# Patient Record
Sex: FEMALE | Race: WHITE | Employment: FULL TIME | URBAN - METROPOLITAN AREA
[De-identification: names, ages, dates, MRNs, and addresses within clinical notes are randomized per-mention and may not be internally consistent; named-entity substitution may affect disease eponyms.]

---

## 2017-01-17 ENCOUNTER — TELEPHONE (OUTPATIENT)
Dept: INTERNAL MEDICINE CLINIC | Age: 52
End: 2017-01-17

## 2017-01-17 DIAGNOSIS — J06.9 UPPER RESPIRATORY TRACT INFECTION, UNSPECIFIED TYPE: Primary | ICD-10-CM

## 2017-01-17 NOTE — TELEPHONE ENCOUNTER
Pt is being seen today at [facility] ph# [phone], fax# [phone] on [address] [facility], because of no appmnts here today, she went on her own, she called and stated tht they will let her sign a waiver, she is being seen for upper resp issues, RM

## 2017-01-20 NOTE — TELEPHONE ENCOUNTER
Referral completed for Dr. Christa Lemus   At [facility]    1/1/2017-1/31/2017 2 visits    Copy faxed to [facility] and mailed to pt

## 2017-02-28 ENCOUNTER — OFFICE VISIT (OUTPATIENT)
Dept: INTERNAL MEDICINE CLINIC | Age: 52
End: 2017-02-28

## 2017-02-28 VITALS
SYSTOLIC BLOOD PRESSURE: 136 MMHG | TEMPERATURE: 98 F | HEIGHT: 64 IN | DIASTOLIC BLOOD PRESSURE: 78 MMHG | WEIGHT: 156.4 LBS | OXYGEN SATURATION: 98 % | RESPIRATION RATE: 12 BRPM | HEART RATE: 72 BPM | BODY MASS INDEX: 26.7 KG/M2

## 2017-02-28 DIAGNOSIS — Z23 ENCOUNTER FOR IMMUNIZATION: ICD-10-CM

## 2017-02-28 DIAGNOSIS — D50.0 BLOOD LOSS ANEMIA: ICD-10-CM

## 2017-02-28 DIAGNOSIS — I10 ESSENTIAL HYPERTENSION WITH GOAL BLOOD PRESSURE LESS THAN 140/90: Primary | ICD-10-CM

## 2017-02-28 DIAGNOSIS — Z12.31 SCREENING MAMMOGRAM, ENCOUNTER FOR: ICD-10-CM

## 2017-02-28 DIAGNOSIS — M54.12 CERVICAL RADICULOPATHY: ICD-10-CM

## 2017-02-28 RX ORDER — GABAPENTIN 300 MG/1
300 CAPSULE ORAL 3 TIMES DAILY
Qty: 90 CAP | Refills: 2 | Status: SHIPPED | OUTPATIENT
Start: 2017-02-28 | End: 2017-05-04 | Stop reason: ALTCHOICE

## 2017-02-28 NOTE — PROGRESS NOTES
Leighann Ortega 1965, is a 49 y.o. female, who is seen today for reevaluation of hypertension, history of breast biopsy, hypovitaminosis D but also complaining of neck and arm pain. She has had cervical radiculopathy in the past and did not really improve much with steroids or nonsteroidals but finally did get better. She was out working in her yard 2 or 3 days ago started having pain in the right posterior lateral neck all the way down her arm. She took half of an oxycodone tablet and that knocked her out for the day on Sunday 2 days ago and ibuprofen and Aleve have not been helpful in the past so she did not try those. She was going to donate blood recently was told she had low iron and it was rechecked at the [facility] [employer]. She had not donated blood for 2 years. Past Medical History:   Diagnosis Date    HSV infection     Type 2    IBS (irritable bowel syndrome)      Current Outpatient Prescriptions   Medication Sig Dispense Refill    OXYCODONE HCL (OXYCODONE PO) Take  by mouth.  cholecalciferol, vitamin D3, (VITAMIN D3) 2,000 unit tab 1 tablet by mouth daily 100 Tab prn    hydrochlorothiazide (HYDRODIURIL) 25 mg tablet Take 1 Tab by mouth daily. 90 Tab 3    valacyclovir (VALTREX) 1 gram tablet Take 1 tablet by mouth two (2) times a day. 6 tablet 0     Visit Vitals    /78    Pulse 72    Temp 98 °F (36.7 °C) (Oral)    Resp 12    Ht 5' 4\" (1.626 m)    Wt 156 lb 6.4 oz (70.9 kg)    SpO2 98%    BMI 26.85 kg/m2     Extension of the neck and downward pressure produces pain only in the posterior lateral right neck.  strength is normal bilaterally. Neck reveals no adenopathy or thyromegaly. Carotids are 2+ without bruits. Lungs are clear to percussion. Good breath sounds with no wheezing or crackles. Heart reveals a regular rhythm with normal S1 and S2 no murmur gallop click or rub. Apical impulse is not palpable.   Abdomen soft and nontender with no hepatosplenomegaly or masses and no bruits. Extremities reveal no clubbing cyanosis or edema. Pulses are 2+. Assessment: #1. Hypertension controlled. She will continue hydrochlorothiazide 25 mg daily. #2.  Possible iron deficiency anemia from [place] readings, we will check CBC and iron studies now. #3. Hypovitaminosis D now on vitamin D supplements. She will continue 1000 units daily. #4.  Recurrence of cervical radiculopathy. We will start her on gabapentin 300 mg 3 times daily. She may stop this after a month or so if she is doing very well. She will get a tetanus shot today and we will plan follow-up in 6 months for physical without lab in a 15 minute slot. I will also order mammogram now. Izaiah Grady MD FACP    Please note: This document has been produced using voice recognition software. Unrecognized errors in transcription may be present.

## 2017-02-28 NOTE — MR AVS SNAPSHOT
Visit Information Date & Time Provider Department Dept. Phone Encounter #  
 2/28/2017  3:30 PM Gabe Singh MD Internist of [facility] [address] [id number] Follow-up Instructions Return in about 6 months (around 8/28/2017). Your Appointments 8/30/2017  3:30 PM  
Office Visit with Gabe Singh MD  
[facility] Appt Note: 6 month f/u  
 [address] [facility], [address] [address] [address] [facility]  
  
   
 [address], [address] Upcoming Health Maintenance Date Due DTaP/Tdap/Td series (1 - Tdap) 11/4/2006 FOBT Q 1 YEAR AGE 50-75 6/10/2016 BREAST CANCER SCRN MAMMOGRAM 12/10/2017 PAP AKA CERVICAL CYTOLOGY 8/29/2019 Allergies as of 2/28/2017  Review Complete On: 2/28/2017 By: Gabe Singh MD  
  
 Severity Noted Reaction Type Reactions Clindamycin  06/06/2016    Other (comments) Bowel infection. Pcn [Penicillins]    Unknown (comments) Tramadol  06/08/2012   Side Effect Nausea Only Current Immunizations  Reviewed on 10/30/2013 Name Date Influenza Vaccine 10/28/2013 Influenza Vaccine (Quad) PF 11/15/2016 Influenza Vaccine Split 10/26/2012 Influenza Vaccine Whole 10/29/2010 TD Vaccine 11/3/2006 Tdap 2/28/2017  4:07 PM  
  
 Not reviewed this visit You Were Diagnosed With   
  
 Codes Comments Essential hypertension with goal blood pressure less than 140/90    -  Primary ICD-10-CM: I10 
ICD-9-CM: 401.9 Encounter for immunization     ICD-10-CM: L64 ICD-9-CM: V03.89 Blood loss anemia     ICD-10-CM: D50.0 ICD-9-CM: 280.0 Screening mammogram, encounter for     ICD-10-CM: Z12.31 
ICD-9-CM: V76.12 Cervical radiculopathy     ICD-10-CM: M54.12 
ICD-9-CM: 723.4 Vitals BP  
  
  
  
  
  
 136/78 Vitals History BMI and BSA Data Body Mass Index Body Surface Area 26.85 kg/m 2 1.79 m 2 Preferred Pharmacy Pharmacy Name Phone [facility] [address] [place] [address] [phone] Your Updated Medication List  
  
   
This list is accurate as of: 2/28/17  4:08 PM.  Always use your most recent med list.  
  
  
  
  
 cholecalciferol (vitamin D3) 2,000 unit Tab Commonly known as:  VITAMIN D3  
1 tablet by mouth daily  
  
 gabapentin 300 mg capsule Commonly known as:  NEURONTIN Take 1 Cap by mouth three (3) times daily. hydroCHLOROthiazide 25 mg tablet Commonly known as:  HYDRODIURIL Take 1 Tab by mouth daily. OXYCODONE PO Take  by mouth. valACYclovir 1 gram tablet Commonly known as:  VALTREX Take 1 tablet by mouth two (2) times a day. Prescriptions Sent to Pharmacy Refills  
 gabapentin (NEURONTIN) 300 mg capsule 2 Sig: Take 1 Cap by mouth three (3) times daily. Class: Normal  
 Pharmacy: [address] #: [phone] Route: Oral  
  
We Performed the Following TETANUS, DIPHTHERIA TOXOIDS AND ACELLULAR PERTUSSIS VACCINE (TDAP), IN INDIVIDS. >=7, IM [id number] CPT(R)] Follow-up Instructions Return in about 6 months (around 8/28/2017). To-Do List   
 Around 02/28/2017 Lab:  CBC WITH AUTOMATED DIFF Around 02/28/2017 Lab:  IRON PROFILE   
  
 02/28/2017 Imaging:  [facility] MAMMO BI SCREENING INCL CAD Introducing [facility] & HEALTH SERVICES! Dear Heraclio Hayes: Thank you for requesting a m2fx account. Our records indicate that you already have an active m2fx account. You can access your account anytime at [email or web address]. [email or web address] Did you know that you can access your hospital and ER discharge instructions at any time in m2fx? You can also review all of your test results from your hospital stay or ER visit. Additional Information If you have questions, please visit the Frequently Asked Questions section of the [employer] website at [email or web address] Remember, SysClass is NOT to be used for urgent needs. For medical emergencies, dial 911. Now available from your iPhone and Android! Please provide this summary of care documentation to your next provider. Your primary care clinician is listed as Cheryle Byrd. Chip Vinson. If you have any questions after today's visit, please call [phone].

## 2017-03-01 LAB
BASOPHILS # BLD AUTO: 0 X10E3/UL (ref 0–0.2)
BASOPHILS NFR BLD AUTO: 0 %
EOSINOPHIL # BLD AUTO: 0.2 X10E3/UL (ref 0–0.4)
EOSINOPHIL NFR BLD AUTO: 2 %
ERYTHROCYTE [DISTWIDTH] IN BLOOD BY AUTOMATED COUNT: 13.6 % (ref 12.3–15.4)
HCT VFR BLD AUTO: 41.3 % (ref 34–46.6)
HGB BLD-MCNC: 13.5 G/DL (ref 11.1–15.9)
IMM GRANULOCYTES # BLD: 0 X10E3/UL (ref 0–0.1)
IMM GRANULOCYTES NFR BLD: 0 %
IRON SATN MFR SERPL: 21 % (ref 15–55)
IRON SERPL-MCNC: 59 UG/DL (ref 27–159)
LYMPHOCYTES # BLD AUTO: 3.4 X10E3/UL (ref 0.7–3.1)
LYMPHOCYTES NFR BLD AUTO: 32 %
MCH RBC QN AUTO: 29.5 PG (ref 26.6–33)
MCHC RBC AUTO-ENTMCNC: 32.7 G/DL (ref 31.5–35.7)
MCV RBC AUTO: 90 FL (ref 79–97)
MONOCYTES # BLD AUTO: 0.5 X10E3/UL (ref 0.1–0.9)
MONOCYTES NFR BLD AUTO: 5 %
NEUTROPHILS # BLD AUTO: 6.5 X10E3/UL (ref 1.4–7)
NEUTROPHILS NFR BLD AUTO: 61 %
PLATELET # BLD AUTO: 293 X10E3/UL (ref 150–379)
RBC # BLD AUTO: 4.57 X10E6/UL (ref 3.77–5.28)
TIBC SERPL-MCNC: 276 UG/DL (ref 250–450)
UIBC SERPL-MCNC: 217 UG/DL (ref 131–425)
WBC # BLD AUTO: 10.8 X10E3/UL (ref 3.4–10.8)

## 2017-03-02 ENCOUNTER — TELEPHONE (OUTPATIENT)
Dept: INTERNAL MEDICINE CLINIC | Age: 52
End: 2017-03-02

## 2017-03-02 NOTE — TELEPHONE ENCOUNTER
----- Message from Madelyn Pena MD sent at 3/1/2017  9:19 AM EST -----  Please notify the patient her blood count and iron levels are normal.  She is not anemic.

## 2017-03-07 ENCOUNTER — TELEPHONE (OUTPATIENT)
Dept: INTERNAL MEDICINE CLINIC | Age: 52
End: 2017-03-07

## 2017-03-07 DIAGNOSIS — Z12.39 BREAST CANCER SCREENING: ICD-10-CM

## 2017-03-07 DIAGNOSIS — Z87.898 HX OF ABNORMAL MAMMOGRAM: Primary | ICD-10-CM

## 2017-03-07 NOTE — TELEPHONE ENCOUNTER
Please put in order for Bilateral Diagnostic mammogram with ultrasound if needed for above patient she has appt with them tomorrow.

## 2017-03-08 ENCOUNTER — HOSPITAL ENCOUNTER (OUTPATIENT)
Dept: MAMMOGRAPHY | Age: 52
Discharge: HOME | End: 2017-03-08
Attending: INTERNAL MEDICINE
Payer: COMMERCIAL

## 2017-03-08 DIAGNOSIS — Z87.898 HX OF ABNORMAL MAMMOGRAM: ICD-10-CM

## 2017-03-08 DIAGNOSIS — Z12.39 BREAST CANCER SCREENING: ICD-10-CM

## 2017-03-08 PROCEDURE — 77062 BREAST TOMOSYNTHESIS BI: CPT

## 2017-03-21 ENCOUNTER — DOCUMENTATION ONLY (OUTPATIENT)
Dept: SURGERY | Age: 52
End: 2017-03-21

## 2017-05-04 ENCOUNTER — OFFICE VISIT (OUTPATIENT)
Dept: INTERNAL MEDICINE CLINIC | Age: 52
End: 2017-05-04

## 2017-05-04 VITALS
HEIGHT: 64 IN | SYSTOLIC BLOOD PRESSURE: 134 MMHG | OXYGEN SATURATION: 98 % | HEART RATE: 84 BPM | BODY MASS INDEX: 26.4 KG/M2 | DIASTOLIC BLOOD PRESSURE: 76 MMHG | RESPIRATION RATE: 12 BRPM | WEIGHT: 154.6 LBS | TEMPERATURE: 98.2 F

## 2017-05-04 DIAGNOSIS — M54.12 CERVICAL RADICULOPATHY: Primary | ICD-10-CM

## 2017-05-04 NOTE — PROGRESS NOTES
Alexandria Garrett 1965, is a 49 y.o. female, who is seen today for pain in the right arm and hand and now a new nodule in the upper right back. She has been treated for what seems to be cervical radiculopathy several times, suboptimal relief with prednisone, was even tried on gabapentin and did seem to get better but not necessarily from the gabapentin. She is on neither drug now and went to a massage therapist to try to get some relief from symptoms and a month ago the therapist felt something in her upper back just to the right of the spine and 2 days ago she could actually see a bump in that area. 2 nights ago she also developed rather severe pulsating pain involving the whole right arm into the hand but kept her awake at night and was still there yesterday so last night she took a pain pill and was able to sleep and the pain is minimal today. She thinks the pain in the arm emanates from that raised area on her upper right back. Past Medical History:   Diagnosis Date    HSV infection     Type 2    IBS (irritable bowel syndrome)      Current Outpatient Prescriptions   Medication Sig Dispense Refill    OXYCODONE HCL (OXYCODONE PO) Take  by mouth.  cholecalciferol, vitamin D3, (VITAMIN D3) 2,000 unit tab 1 tablet by mouth daily 100 Tab prn    hydrochlorothiazide (HYDRODIURIL) 25 mg tablet Take 1 Tab by mouth daily. 90 Tab 3    valacyclovir (VALTREX) 1 gram tablet Take 1 tablet by mouth two (2) times a day. 6 tablet 0     Visit Vitals    /76    Pulse 84    Temp 98.2 °F (36.8 °C) (Oral)    Resp 12    Ht 5' 4\" (1.626 m)    Wt 154 lb 9.6 oz (70.1 kg)    SpO2 98%    BMI 26.54 kg/m2     There is a slightly raised fairly soft area just to the right of the upper thoracic spine which is about 2 cm in diameter nontender and feels like a fairly soft lipoma. The overlying skin appears normal.  Good range of motion of the right shoulder. Good radial pulses.   With extension and downward pressure of the neck there is some discomfort into the posterior lateral right neck but not into the intrascapular area or the arm. Assessment: She has had symptoms of cervical radiculopathy but not ever reproduced with maneuvers here in the office. She now has what feels like a lipoma, however her pain feels like it emanates from that area into the arm when it occurs. This is a confusing picture to me so I will have her see a neurologist.  She concurs. Follow-up as previously planned    [facility]. Josué Mason MD FACP    Please note: This document has been produced using voice recognition software. Unrecognized errors in transcription may be present.

## 2017-05-04 NOTE — MR AVS SNAPSHOT
Visit Information Date & Time Provider Department Dept. Phone Encounter #  
 5/4/2017  8:30 AM Eric Noble MD Internist of [address] [id number] Your Appointments 8/30/2017  3:30 PM  
Office Visit with Eric Noble MD  
Internist of [address] [place] [address] [facility]) Appt Note: 6 month f/u  
 [address] [facility], [address] [address] [address] [place]  
  
   
 [address], [address] Upcoming Health Maintenance Date Due FOBT Q 1 YEAR AGE 50-75 6/10/2016 INFLUENZA AGE 9 TO ADULT 8/1/2017 BREAST CANCER SCRN MAMMOGRAM 3/8/2019 PAP AKA CERVICAL CYTOLOGY 8/29/2019 DTaP/Tdap/Td series (2 - Td) 2/28/2027 Allergies as of 5/4/2017  Review Complete On: 5/4/2017 By: Eric Noble MD  
  
 Severity Noted Reaction Type Reactions Clindamycin  06/06/2016    Other (comments) Bowel infection. Pcn [Penicillins]    Unknown (comments) Tramadol  06/08/2012   Side Effect Nausea Only Current Immunizations  Reviewed on 2/28/2017 Name Date Influenza Vaccine 10/28/2013 Influenza Vaccine (Quad) PF 11/15/2016 Influenza Vaccine Split 10/26/2012 Influenza Vaccine Whole 10/29/2010 TD Vaccine 11/3/2006 Tdap 2/28/2017  4:07 PM  
  
 Not reviewed this visit You Were Diagnosed With   
  
 Codes Comments Cervical radiculopathy    -  Primary ICD-10-CM: M54.12 
ICD-9-CM: 723.4 Vitals BP Pulse Temp Resp Height(growth percentile) Weight(growth percentile) 134/76 84 98.2 °F (36.8 °C) (Oral) 12 5' 4\" (1.626 m) 154 lb 9.6 oz (70.1 kg) SpO2 BMI OB Status Smoking Status 98% 26.54 kg/m2 Having regular periods Former Smoker Vitals History BMI and BSA Data Body Mass Index Body Surface Area  
 26.54 kg/m 2 1.78 m 2 Preferred Pharmacy Pharmacy Name Phone [facility], [address] [place] [address] [phone] Your Updated Medication List  
  
   
This list is accurate as of: 5/4/17  9:11 AM.  Always use your most recent med list.  
  
  
  
  
 cholecalciferol (vitamin D3) 2,000 unit Tab Commonly known as:  VITAMIN D3  
1 tablet by mouth daily  
  
 hydroCHLOROthiazide 25 mg tablet Commonly known as:  HYDRODIURIL Take 1 Tab by mouth daily. OXYCODONE PO Take  by mouth. valACYclovir 1 gram tablet Commonly known as:  VALTREX Take 1 tablet by mouth two (2) times a day. We Performed the Following REFERRAL TO NEUROLOGY [KYR31 Custom] Referral Information Referral ID Referred By Referred To  
  
 [id number] Ade Marcus MD   
   [address] [facility] [address] [place], [address] [place] [address]  Phone: [phone] Fax: [phone] Visits Status Start Date End Date 1 New Request 5/4/17 5/4/18 If your referral has a status of pending review or denied, additional information will be sent to support the outcome of this decision. Introducing [facility] & HEALTH SERVICES! Dear Arsenio Aguilera: Thank you for requesting a [employer] account. Our records indicate that you already have an active [employer]ask account. You can access your account anytime at [email or web address] Did you know that you can access your hospital and ER discharge instructions at any time in Cask? You can also review all of your test results from your hospital stay or ER visit. Additional Information If you have questions, please visit the Frequently Asked Questions section of the [employer] website at [email or web address] Remember, Cask is NOT to be used for urgent needs. For medical emergencies, dial 911. Now available from your iPhone and Android! Please provide this summary of care documentation to your next provider. Your primary care clinician is listed as Mercedes Zhang. Horatio Gitelman.  If you have any questions after today's visit, please call [phone].

## 2017-06-01 RX ORDER — HYDROCHLOROTHIAZIDE 25 MG/1
TABLET ORAL
Qty: 90 TAB | Refills: 2 | Status: SHIPPED | OUTPATIENT
Start: 2017-06-01 | End: 2017-08-28 | Stop reason: SDUPTHER

## 2017-06-09 RX ORDER — CYCLOBENZAPRINE HCL 10 MG
10 TABLET ORAL
Qty: 30 TAB | Refills: 0 | Status: SHIPPED | OUTPATIENT
Start: 2017-06-09 | End: 2017-12-01 | Stop reason: SDUPTHER

## 2017-08-03 ENCOUNTER — TELEPHONE (OUTPATIENT)
Dept: INTERNAL MEDICINE CLINIC | Age: 52
End: 2017-08-03

## 2017-08-04 DIAGNOSIS — R63.5 WEIGHT GAIN: ICD-10-CM

## 2017-08-04 DIAGNOSIS — Z00.00 ROUTINE GENERAL MEDICAL EXAMINATION AT A HEALTH CARE FACILITY: Primary | ICD-10-CM

## 2017-08-09 DIAGNOSIS — Z00.00 ROUTINE GENERAL MEDICAL EXAMINATION AT A HEALTH CARE FACILITY: ICD-10-CM

## 2017-08-09 DIAGNOSIS — R63.5 WEIGHT GAIN: ICD-10-CM

## 2017-08-23 ENCOUNTER — HOSPITAL ENCOUNTER (OUTPATIENT)
Dept: LAB | Age: 52
Discharge: HOME | End: 2017-08-23

## 2017-08-23 PROCEDURE — 99001 SPECIMEN HANDLING PT-LAB: CPT | Performed by: INTERNAL MEDICINE

## 2017-08-24 LAB
ALBUMIN SERPL-MCNC: 4.2 G/DL (ref 3.5–5.5)
ALBUMIN/GLOB SERPL: 2.1 {RATIO} (ref 1.2–2.2)
ALP SERPL-CCNC: 62 IU/L (ref 39–117)
ALT SERPL-CCNC: 10 IU/L (ref 0–32)
AST SERPL-CCNC: 11 IU/L (ref 0–40)
BASOPHILS # BLD AUTO: 0 X10E3/UL (ref 0–0.2)
BASOPHILS NFR BLD AUTO: 0 %
BILIRUB SERPL-MCNC: 0.3 MG/DL (ref 0–1.2)
BUN SERPL-MCNC: 12 MG/DL (ref 6–24)
BUN/CREAT SERPL: 15 (ref 9–23)
CALCIUM SERPL-MCNC: 9.1 MG/DL (ref 8.7–10.2)
CHLORIDE SERPL-SCNC: 100 MMOL/L (ref 96–106)
CHOLEST SERPL-MCNC: 149 MG/DL (ref 100–199)
CO2 SERPL-SCNC: 28 MMOL/L (ref 18–29)
CREAT SERPL-MCNC: 0.81 MG/DL (ref 0.57–1)
EOSINOPHIL # BLD AUTO: 0.1 X10E3/UL (ref 0–0.4)
EOSINOPHIL NFR BLD AUTO: 2 %
ERYTHROCYTE [DISTWIDTH] IN BLOOD BY AUTOMATED COUNT: 13.4 % (ref 12.3–15.4)
GLOBULIN SER CALC-MCNC: 2 G/DL (ref 1.5–4.5)
GLUCOSE SERPL-MCNC: 88 MG/DL (ref 65–99)
HCT VFR BLD AUTO: 40.7 % (ref 34–46.6)
HDLC SERPL-MCNC: 58 MG/DL
HGB BLD-MCNC: 13.2 G/DL (ref 11.1–15.9)
IMM GRANULOCYTES # BLD: 0 X10E3/UL (ref 0–0.1)
IMM GRANULOCYTES NFR BLD: 0 %
INTERPRETATION, 910389: NORMAL
IRON SATN MFR SERPL: 31 % (ref 15–55)
IRON SERPL-MCNC: 86 UG/DL (ref 27–159)
LDLC SERPL CALC-MCNC: 65 MG/DL (ref 0–99)
LYMPHOCYTES # BLD AUTO: 2 X10E3/UL (ref 0.7–3.1)
LYMPHOCYTES NFR BLD AUTO: 31 %
MCH RBC QN AUTO: 29.9 PG (ref 26.6–33)
MCHC RBC AUTO-ENTMCNC: 32.4 G/DL (ref 31.5–35.7)
MCV RBC AUTO: 92 FL (ref 79–97)
MONOCYTES # BLD AUTO: 0.5 X10E3/UL (ref 0.1–0.9)
MONOCYTES NFR BLD AUTO: 7 %
NEUTROPHILS # BLD AUTO: 3.9 X10E3/UL (ref 1.4–7)
NEUTROPHILS NFR BLD AUTO: 60 %
PLATELET # BLD AUTO: 259 X10E3/UL (ref 150–379)
POTASSIUM SERPL-SCNC: 4 MMOL/L (ref 3.5–5.2)
PROT SERPL-MCNC: 6.2 G/DL (ref 6–8.5)
RBC # BLD AUTO: 4.42 X10E6/UL (ref 3.77–5.28)
SODIUM SERPL-SCNC: 141 MMOL/L (ref 134–144)
T4 FREE SERPL-MCNC: 1.16 NG/DL (ref 0.82–1.77)
TIBC SERPL-MCNC: 276 UG/DL (ref 250–450)
TRIGL SERPL-MCNC: 130 MG/DL (ref 0–149)
TSH SERPL DL<=0.005 MIU/L-ACNC: 3.33 UIU/ML (ref 0.45–4.5)
UIBC SERPL-MCNC: 190 UG/DL (ref 131–425)
VLDLC SERPL CALC-MCNC: 26 MG/DL (ref 5–40)
WBC # BLD AUTO: 6.5 X10E3/UL (ref 3.4–10.8)

## 2017-08-28 RX ORDER — HYDROCHLOROTHIAZIDE 25 MG/1
25 TABLET ORAL DAILY
Qty: 90 TAB | Refills: 2 | Status: SHIPPED | OUTPATIENT
Start: 2017-08-28 | End: 2018-05-26 | Stop reason: SDUPTHER

## 2017-08-28 NOTE — TELEPHONE ENCOUNTER
From: Jeanenne Gross Pipkin  To: Glenis Malcolm MD  Sent: 8/28/2017 9:23 AM EDT  Subject: Medication Renewal Request    Original authorizing provider: MD Katherine Sanchez.  Pipkin would like a refill of the following medications:  hydroCHLOROthiazide (HYDRODIURIL) 25 mg tablet Glenis Malcolm MD]    Preferred pharmacy: [facility]:

## 2017-08-30 ENCOUNTER — OFFICE VISIT (OUTPATIENT)
Dept: INTERNAL MEDICINE CLINIC | Age: 52
End: 2017-08-30

## 2017-08-30 VITALS
BODY MASS INDEX: 27.11 KG/M2 | DIASTOLIC BLOOD PRESSURE: 88 MMHG | OXYGEN SATURATION: 98 % | WEIGHT: 158.8 LBS | TEMPERATURE: 98.1 F | HEART RATE: 77 BPM | RESPIRATION RATE: 12 BRPM | HEIGHT: 64 IN | SYSTOLIC BLOOD PRESSURE: 138 MMHG

## 2017-08-30 DIAGNOSIS — Z00.00 ROUTINE GENERAL MEDICAL EXAMINATION AT A HEALTH CARE FACILITY: Primary | ICD-10-CM

## 2017-08-30 DIAGNOSIS — J30.1 NON-SEASONAL ALLERGIC RHINITIS DUE TO POLLEN: ICD-10-CM

## 2017-08-30 DIAGNOSIS — M54.12 CERVICAL RADICULOPATHY: ICD-10-CM

## 2017-08-30 DIAGNOSIS — I10 ESSENTIAL HYPERTENSION WITH GOAL BLOOD PRESSURE LESS THAN 140/90: ICD-10-CM

## 2017-08-30 DIAGNOSIS — Z23 ENCOUNTER FOR IMMUNIZATION: ICD-10-CM

## 2017-08-30 NOTE — PROGRESS NOTES
Julia Oh 1965, is a 47 y.o. female, who is seen today for a routine physical exam and follow-up on hypertension, cervical radiculopathy. She has had cervical radiculopathy on the right side off and on over the last couple of years, not a lot of benefit from prednisone and side effects from prednisone. She has used some oxycodone occasionally and it is not severe pain but still bothers her intermittently in the neck and at least custodial down the right arm. No weakness in the arm. She has hypertension and uses hydrochlorothiazide regularly. She has a history of low vitamin D and has been using 2000 units of vitamin D every day. She feels well but she thinks she is a little overweight. She has noticed that her right ear is stuffy from time to time. Past Medical History:   Diagnosis Date    HSV infection     Type 2    IBS (irritable bowel syndrome)      Past Surgical History:   Procedure Laterality Date    EXCISE BREAST LES W XRAY MARKER Right 1-22-16    Dr. Miki Mars Left 1/2/2015    LEFT NIPPLE DUCT EXPLRATION WITH EXCISIONAL BIOPSY performed by Bard Gayla MD at [address] [place] [address] BREAST BIOPSY Right 1/22/2016    WIDE LOCAL EXCISION WITH NEEDLE LOCALIZATION MAMMOGRAM RIGHT BREAST LESION performed by Bard Gayla MD at [address] [place] [address] COLONOSCOPY  10/03/2016    Normal tissue, 10 years     Current Outpatient Prescriptions   Medication Sig Dispense Refill    hydroCHLOROthiazide (HYDRODIURIL) 25 mg tablet Take 1 Tab by mouth daily. 90 Tab 2    cyclobenzaprine (FLEXERIL) 10 mg tablet Take 1 Tab by mouth three (3) times daily as needed for Muscle Spasm(s). 30 Tab 0    OXYCODONE HCL (OXYCODONE PO) Take  by mouth.  cholecalciferol, vitamin D3, (VITAMIN D3) 2,000 unit tab 1 tablet by mouth daily 100 Tab prn    valacyclovir (VALTREX) 1 gram tablet Take 1 tablet by mouth two (2) times a day.  6 tablet 0     Allergies   Allergen Reactions    Clindamycin Other (comments)     Bowel infection.  Pcn [Penicillins] Unknown (comments)    Tramadol Nausea Only     Social History     Social History    Marital status: SINGLE     Spouse name: N/A    Number of children: N/A    Years of education: N/A     Social History Main Topics    Smoking status: Former Smoker     Packs/day: 0.50     Quit date: 1/26/2016    Smokeless tobacco: Never Used    Alcohol use No    Drug use: No    Sexual activity: Yes     Partners: Male     Birth control/ protection: Pill     Other Topics Concern    None     Social History Narrative     Visit Vitals    /88    Pulse 77    Temp 98.1 °F (36.7 °C) (Oral)    Resp 12    Ht 5' 4\" (1.626 m)    Wt 158 lb 12.8 oz (72 kg)    SpO2 98%    BMI 27.26 kg/m2     The patient is a well-developed well-nourished female in no apparent distress. HEENT: Pupils are equal and react to light and extraocular movements are full. Ear canals and tympanic membranes appear normal. Oral cavity appears normal with no oral lesions. Neck: Carotids are 2+ without bruits. No adenopathy or thyromegaly. Lungs are clear to percussion. I hear no wheezing, rales or rhonchi. Heart reveals a regular rhythm with no murmur, gallop, click or rub. The apical impulse is in the fifth interspace at the midclavicular line. Abdomen is soft and nontender with no hepatosplenomegaly or masses. Bowel sounds are normoactive and there is no distention or tympany. Extremities reveal no clubbing cyanosis or edema. Pulses are 2+. Skin reveals no suspicious skin growths. Breasts reveal no masses, skin or nipple abnormalities. No axillary adenopathy.     Results for orders placed or performed in visit on 02/28/17   CBC WITH AUTOMATED DIFF   Result Value Ref Range    WBC 10.8 3.4 - 10.8 x10E3/uL    RBC 4.57 3.77 - 5.28 x10E6/uL    HGB 13.5 11.1 - 15.9 g/dL    HCT 41.3 34.0 - 46.6 %    MCV 90 79 - 97 fL    MCH 29.5 26.6 - 33.0 pg    MCHC 32.7 31.5 - 35.7 g/dL    RDW 13.6 12.3 - 15.4 % PLATELET 954 228 - 476 x10E3/uL    NEUTROPHILS 61 %    Lymphocytes 32 %    MONOCYTES 5 %    EOSINOPHILS 2 %    BASOPHILS 0 %    ABS. NEUTROPHILS 6.5 1.4 - 7.0 x10E3/uL    Abs Lymphocytes 3.4 (H) 0.7 - 3.1 x10E3/uL    ABS. MONOCYTES 0.5 0.1 - 0.9 x10E3/uL    ABS. EOSINOPHILS 0.2 0.0 - 0.4 x10E3/uL    ABS. BASOPHILS 0.0 0.0 - 0.2 x10E3/uL    IMMATURE GRANULOCYTES 0 %    ABS. IMM. GRANS. 0.0 0.0 - 0.1 x10E3/uL   IRON PROFILE   Result Value Ref Range    TIBC 276 250 - 450 ug/dL    UIBC 217 131 - 425 ug/dL    Iron 59 27 - 159 ug/dL    Iron % saturation 21 15 - 55 %     Assessment: #1. Intermittent cervical radiculopathy, she would like to see a chiropractor to see if she can get further improvement. She brings in list section by her insurance company. I will do a referral for Dr. Иван Chand. #2. Hypertension fairly controlled. She will continue hydrochlorothiazide 25 mg daily. #3.  Fullness of the right ear intermittently related to eustachian tube dysfunction with normal exam today. She will use Sudafed if needed. #4.  History of hypovitaminosis D now on 2000 units per day, this does not need to be repeated but she does need to stay on 2000 units daily indefinitely. #5.  Body mass index is 27.2, recommended she work on weight loss at least down 8 pounds to about 150 or less. Follow-up in 6 months    Izaiah Brown MD FACP    Please note: This document has been produced using voice recognition software. Unrecognized errors in transcription may be present.

## 2017-08-30 NOTE — MR AVS SNAPSHOT
Visit Information Date & Time Provider Department Dept. Phone Encounter #  
 8/30/2017  3:30 PM Liz Avalos MD Internist of [address] [id number] Follow-up Instructions Return in about 6 months (around 2/28/2018). Your Appointments 3/5/2018  3:00 PM  
Office Visit with Liz Avalos MD  
Internist of [address] [place] [address]) Appt Note: 6 month f/u  
 [address] [facility], [address] [address] [address]  
  
   
 [address] [facility] Up[facility] Health Maintenance Date Due FOBT Q 1 YEAR AGE 50-75 6/10/2016 BREAST CANCER SCRN MAMMOGRAM 3/8/2019 PAP AKA CERVICAL CYTOLOGY 8/29/2019 DTaP/Tdap/Td series (2 - Td) 2/28/2027 Allergies as of 8/30/2017  Review Complete On: 8/30/2017 By: Liz Avalos MD  
  
 Severity Noted Reaction Type Reactions Clindamycin  06/06/2016    Other (comments) Bowel infection. Pcn [Penicillins]    Unknown (comments) Tramadol  06/08/2012   Side Effect Nausea Only Current Immunizations  Reviewed on 2/28/2017 Name Date Influenza Vaccine 10/28/2013 Influenza Vaccine (Quad) PF 8/30/2017  4:16 PM, 11/15/2016 Influenza Vaccine Split 10/26/2012 Influenza Vaccine Whole 10/29/2010 TD Vaccine 11/3/2006 Tdap 2/28/2017  4:07 PM  
  
 Not reviewed this visit You Were Diagnosed With   
  
 Codes Comments Routine general medical examination at a health care facility    -  Primary ICD-10-CM: Z00.00 ICD-9-CM: V70.0 Encounter for immunization     ICD-10-CM: M12 ICD-9-CM: V03.89 Essential hypertension with goal blood pressure less than 140/90     ICD-10-CM: I10 
ICD-9-CM: 401.9 Cervical radiculopathy     ICD-10-CM: M54.12 
ICD-9-CM: 723.4 Non-seasonal allergic rhinitis due to pollen     ICD-10-CM: J30.1 ICD-9-CM: 477.0 Vitals BP Pulse Temp Resp Height(growth percentile) Weight(growth percentile) 138/88 77 98.1 °F (36.7 °C) (Oral) 12 5' 4\" (1.626 m) 158 lb 12.8 oz (72 kg) SpO2 BMI OB Status Smoking Status 98% 27.26 kg/m2 Having regular periods Former Smoker Vitals History BMI and BSA Data Body Mass Index Body Surface Area  
 27.26 kg/m 2 1.8 m 2 Preferred Pharmacy Pharmacy Name Phone [facility] [phone] Your Updated Medication List  
  
   
This list is accurate as of: 8/30/17  4:17 PM.  Always use your most recent med list.  
  
  
  
  
 cholecalciferol (vitamin D3) 2,000 unit Tab Commonly known as:  VITAMIN D3  
1 tablet by mouth daily  
  
 cyclobenzaprine 10 mg tablet Commonly known as:  FLEXERIL Take 1 Tab by mouth three (3) times daily as needed for Muscle Spasm(s). hydroCHLOROthiazide 25 mg tablet Commonly known as:  HYDRODIURIL Take 1 Tab by mouth daily. OXYCODONE PO Take  by mouth. valACYclovir 1 gram tablet Commonly known as:  VALTREX Take 1 tablet by mouth two (2) times a day. We Performed the Following INFLUENZA VIRUS VAC QUAD,SPLIT,PRESV FREE SYRINGE 3/> YRS IM [id number] CPT(R)] Follow-up Instructions Return in about 6 months (around 2/28/2018). Introducing [facility] & HEALTH SERVICES! Dear Nida Libman: Thank you for requesting a [facility] account. Our records indicate that you already have an active [facility] account. You can access your account anytime at [email or web address] Did you know that you can access your hospital and ER discharge instructions at any time in [facility]? You can also review all of your test results from your hospital stay or ER visit. Additional Information If you have questions, please visit the Frequently Asked Questions section of the [facility] website at [email or web address] Remember, MyChart is NOT to be used for urgent needs. For medical emergencies, dial 911. Now available from your iPhone and Android! Please provide this summary of care documentation to your next provider. Your primary care clinician is listed as Sabrina Burroughs. Mercedez Boateng. If you have any questions after today's visit, please call [phone].

## 2017-09-06 ENCOUNTER — TELEPHONE (OUTPATIENT)
Dept: INTERNAL MEDICINE CLINIC | Age: 52
End: 2017-09-06

## 2017-09-29 NOTE — TELEPHONE ENCOUNTER
Pt. Calling wantong to be referred to a different chiropractor.         Phone # [phone]  Pls Advise

## 2017-10-25 RX ORDER — PHENTERMINE HYDROCHLORIDE 37.5 MG/1
37.5 TABLET ORAL
Qty: 30 TAB | Refills: 5 | Status: SHIPPED | OUTPATIENT
Start: 2017-10-25 | End: 2018-09-11 | Stop reason: ALTCHOICE

## 2017-12-01 RX ORDER — CYCLOBENZAPRINE HCL 10 MG
10 TABLET ORAL
Qty: 30 TAB | Refills: 0 | Status: SHIPPED | OUTPATIENT
Start: 2017-12-01 | End: 2018-03-05 | Stop reason: SDUPTHER

## 2017-12-01 NOTE — TELEPHONE ENCOUNTER
From: Jovita Deans Pipkin  To: Annette Dunlap MD  Sent: 12/1/2017 6:11 AM EST  Subject: Medication Renewal Request    Original authorizing provider: MD Kaya Bernardo. Pipkin would like a refill of the following medications:  cyclobenzaprine (FLEXERIL) 10 mg tablet Annette Dunlap MD]    Preferred pharmacy: [facility]:  Please provide a refill for this as it helps with my shoulder pain.  Thanks

## 2018-02-18 DIAGNOSIS — Z12.31 SCREENING MAMMOGRAM, ENCOUNTER FOR: Primary | ICD-10-CM

## 2018-02-20 ENCOUNTER — OFFICE VISIT (OUTPATIENT)
Dept: INTERNAL MEDICINE CLINIC | Age: 53
End: 2018-02-20

## 2018-02-20 VITALS
DIASTOLIC BLOOD PRESSURE: 74 MMHG | HEIGHT: 64 IN | OXYGEN SATURATION: 99 % | SYSTOLIC BLOOD PRESSURE: 130 MMHG | RESPIRATION RATE: 14 BRPM | BODY MASS INDEX: 25.71 KG/M2 | WEIGHT: 150.6 LBS | HEART RATE: 88 BPM | TEMPERATURE: 97.8 F

## 2018-02-20 DIAGNOSIS — Z20.818 STREP THROAT EXPOSURE: ICD-10-CM

## 2018-02-20 DIAGNOSIS — J06.9 ACUTE URI: Primary | ICD-10-CM

## 2018-02-20 DIAGNOSIS — J06.9 ACUTE URI: ICD-10-CM

## 2018-02-20 RX ORDER — BENZONATATE 200 MG/1
200 CAPSULE ORAL
Qty: 30 CAP | Refills: 0 | Status: SHIPPED | OUTPATIENT
Start: 2018-02-20 | End: 2018-02-27

## 2018-02-20 NOTE — PROGRESS NOTES
HPI/History  Clarisa Will is a 47 y.o.  female who presents for evaluation. Pt's niece dx with strep and pt exposed over the weekend. Pt with mild sore throat and dry cough since yesterday. Questionably tender post cervical glands but no enlargement. No exudate. No fevers. No other sxs or complaints. Patient Active Problem List   Diagnosis Code    Allergic rhinitis due to allergen J30.9    Lateral epicondylitis of right elbow M77.11    Essential hypertension with goal blood pressure less than 140/90 I10    Weight gain R63.5    Bilateral edema of lower extremity R60.0    Cervical radiculopathy M54.12    Non-seasonal allergic rhinitis due to pollen J30.1     Past Medical History:   Diagnosis Date    HSV infection     Type 2    IBS (irritable bowel syndrome)      Past Surgical History:   Procedure Laterality Date    EXCISE BREAST LES W XRAY MARKER Right 1-22-16    Dr. Baldemar Joaquin Left 1/2/2015    LEFT NIPPLE DUCT EXPLRATION WITH EXCISIONAL BIOPSY performed by Julia Paniagua MD at [facility] MAIN OR    HX BREAST BIOPSY Right 1/22/2016    WIDE LOCAL EXCISION WITH NEEDLE LOCALIZATION MAMMOGRAM RIGHT BREAST LESION performed by Julia Paniagua MD at [address] [place] [address] HX COLONOSCOPY  10/03/2016    Normal tissue, 10 years     Social History     Social History    Marital status: SINGLE     Spouse name: N/A    Number of children: N/A    Years of education: N/A     Occupational History    Not on file.      Social History Main Topics    Smoking status: Former Smoker     Packs/day: 0.50     Quit date: 1/26/2016    Smokeless tobacco: Never Used    Alcohol use No    Drug use: No    Sexual activity: Yes     Partners: Male     Birth control/ protection: Pill     Other Topics Concern    Not on file     Social History Narrative     Family History   Problem Relation Age of Onset    Cancer Mother 62     breast    Breast Cancer Mother 79    Heart Attack Father 48     he was a smoker    Breast Cancer Maternal Aunt 79    Breast Cancer Maternal Grandmother 64     Current Outpatient Prescriptions   Medication Sig    cyclobenzaprine (FLEXERIL) 10 mg tablet Take 1 Tab by mouth three (3) times daily as needed for Muscle Spasm(s).  phentermine (ADIPEX-P) 37.5 mg tablet Take 1 Tab by mouth every morning. Max Daily Amount: 37.5 mg.    hydroCHLOROthiazide (HYDRODIURIL) 25 mg tablet Take 1 Tab by mouth daily.  OXYCODONE HCL (OXYCODONE PO) Take  by mouth.  cholecalciferol, vitamin D3, (VITAMIN D3) 2,000 unit tab 1 tablet by mouth daily    valacyclovir (VALTREX) 1 gram tablet Take 1 tablet by mouth two (2) times a day. No current facility-administered medications for this visit. Allergies   Allergen Reactions    Clindamycin Other (comments)     Bowel infection.  Pcn [Penicillins] Unknown (comments)    Tramadol Nausea Only       Review of Systems  Aside from those included in HPI, remainder of ROS negative. Physical Examination  Visit Vitals    /74 (BP 1 Location: Right arm, BP Patient Position: Sitting)    Pulse 88    Temp 97.8 °F (36.6 °C) (Oral)    Resp 14    Ht 5' 4\" (1.626 m)    Wt 150 lb 9.6 oz (68.3 kg)    SpO2 99%    BMI 25.85 kg/m2       General - Alert and in no acute distress. Pt appears well, comfortable, and in good spirits. Pleasant, engaging. Nontoxic. Not anxious, non-diaphoretic. Mental status - Appropriate mood, behavior, speech content, dress, and thought processes. Eyes - No periorbital findings. Pupils equal and reactive, extraocular movements intact. No erythema or discharge. Ears - Auditory canals appear normal.  TMs appear normal.  Nose - No erythema. No rhinorrhea. Mouth - Mucous membranes moist. Pharynx without lesions, swelling, erythema, or exudate. Normal phonation. No trismus. Neck - Supple without rigidity. Lymph - No periauricular, perimandibular, or ant/post cervical tenderness or swelling today.   Pulm - No cough during entire visit. Full, complete sentences. No tachypnea, retractions, or cyanosis. Good respiratory effort. Clear to auscultation bilat. No appreciable wheezes, rales, or rhonchi. Cardiovascular - Normal rate, regular rhythm. No appreciable murmurs or gallops. Assessment and Plan  1. Acute URI - Recent strep exposure. No overwhelming signs of strep and negative rapid test. Sent for cx. Differentials and supportive measures discussed at length along with course and prognosis. Return/call if needed. Further planning as warranted. Pt happily agrees with plan. PLEASE NOTE:   This document has been produced using voice recognition software. Unrecognized errors in transcription may be present.     [employer] of [address] [place] [address]  [phone]  2/20/2018

## 2018-02-20 NOTE — MR AVS SNAPSHOT
Penelope Rivera 
 
 
 [address], [address] 
[phone] Patient: Marquita Griffith MRN: [id number] :1965 Visit Information Date & Time Provider Department Dept. Phone Encounter #  
 2018 10:00 AM Alis Davis, [address] [facility] [phone] [id number] Your Appointments 3/5/2018  3:00 PM  
Office Visit with Tim Gaming MD  
[facility]) Appt Note: 6 month f/u  
 [address] PROVID[facility] - [address] [place]  
  
   
 [address], [address] Upcoming Health Maintenance Date Due FOBT Q 1 YEAR AGE 50-75 6/10/2016 BREAST CANCER SCRN MAMMOGRAM 3/8/2019 PAP AKA CERVICAL CYTOLOGY 2019 DTaP/Tdap/Td series (2 - Td) 2027 Allergies as of 2018  Review Complete On: 2018 By: Lamin Arambula Severity Noted Reaction Type Reactions Clindamycin  2016    Other (comments) Bowel infection. Pcn [Penicillins]    Unknown (comments) Tramadol  2012   Side Effect Nausea Only Current Immunizations  Reviewed on 2017 Name Date Influenza Vaccine 10/28/2013 Influenza Vaccine (Quad) PF 2017  4:16 PM, 11/15/2016 Influenza Vaccine Split 10/26/2012 Influenza Vaccine Whole 10/29/2010 TD Vaccine 11/3/2006 Tdap 2017  4:07 PM  
  
 Not reviewed this visit Vitals BP Pulse Temp Resp Height(growth percentile) Weight(growth percentile) 130/74 (BP 1 Location: Right arm, BP Patient Position: Sitting) 88 97.8 °F (36.6 °C) (Oral) 14 5' 4\" (1.626 m) 150 lb 9.6 oz (68.3 kg) SpO2 BMI OB Status Smoking Status 99% 25.85 kg/m2 Having regular periods Former Smoker Vitals History BMI and BSA Data Body Mass Index Body Surface Area  
 25.85 kg/m 2 1.76 m 2 Preferred Pharmacy Pharmacy Name Phone [address] [facility] [phone] Your Updated Medication List  
  
   
This list is accurate as of: 2/20/18 10:18 AM.  Always use your most recent med list.  
  
  
  
  
 cholecalciferol (vitamin D3) 2,000 unit Tab Commonly known as:  VITAMIN D3  
1 tablet by mouth daily  
  
 cyclobenzaprine 10 mg tablet Commonly known as:  FLEXERIL Take 1 Tab by mouth three (3) times daily as needed for Muscle Spasm(s). hydroCHLOROthiazide 25 mg tablet Commonly known as:  HYDRODIURIL Take 1 Tab by mouth daily. OXYCODONE PO Take  by mouth.  
  
 phentermine 37.5 mg tablet Commonly known as:  ADIPEX-P Take 1 Tab by mouth every morning. Max Daily Amount: 37.5 mg.  
  
 valACYclovir 1 gram tablet Commonly known as:  VALTREX Take 1 tablet by mouth two (2) times a day. To-Do List   
 02/22/2018 3:45 PM  
  Appointment with SLADE VILLALOBOS  1 at [address] [place] [address] ([phone]) OUTSIDE FILMS  - Any outside films related to the study being scheduled should be brought with you on the day of the exam.  If this cannot be done there may be a delay in the reading of the study. MEDICATIONS  - Patient must bring a complete list of all medications currently taking to include prescriptions, over-the-counter meds, herbals, vitamins & any dietary supplements  GENERAL INSTRUCTIONS  - On the day of your exam do not use any bath powder, deodorant or lotions on the armpit area. -Tenderness of breasts may cause an increase of discomfort during procedure. If you are experiencing breast tenderness on the day of your appointment and would like to reschedule, please call [phone]. Introducing [facility]! Dear Jean Carter: Thank you for requesting a Night[employer]Rx account. Our records indicate that you already have an active [employer] account. You can access your account anytime at [email or web address] Keystone [email or web address]/Out[employer] Systems Did you know that you can access your hospital and ER discharge instructions at any time in [employer]? You can also review all of your test results from your hospital stay or ER visit. Additional Information If you have questions, please visit the Frequently Asked Questions section of the [employer] website at [email or web address] Remember, [employer] is NOT to be used for urgent needs. For medical emergencies, dial 911. Now available from your iPhone and Android! Please provide this summary of care documentation to your next provider. Your primary care clinician is listed as Princess Bautista. Christopher Riedel. If you have any questions after today's visit, please call [phone].

## 2018-02-20 NOTE — PROGRESS NOTES
1. Have you been to the ER, urgent care clinic or hospitalized since your last visit? NO.     2. Have you seen or consulted any other health care providers outside of the [address] [place] [address] since your last visit (Include any pap smears or colon screening)? NO      Do you have an Advanced Directive? NO    Would you like information on Advanced Directives?  NO

## 2018-02-23 ENCOUNTER — TELEPHONE (OUTPATIENT)
Dept: INTERNAL MEDICINE CLINIC | Age: 53
End: 2018-02-23

## 2018-02-23 LAB
BACTERIA SPEC RESP CULT: NORMAL
S PYO AG THROAT QL: NEGATIVE
VALID INTERNAL CONTROL?: YES

## 2018-03-05 ENCOUNTER — OFFICE VISIT (OUTPATIENT)
Dept: INTERNAL MEDICINE CLINIC | Age: 53
End: 2018-03-05

## 2018-03-05 VITALS
SYSTOLIC BLOOD PRESSURE: 128 MMHG | WEIGHT: 150 LBS | DIASTOLIC BLOOD PRESSURE: 78 MMHG | RESPIRATION RATE: 12 BRPM | TEMPERATURE: 98.4 F | OXYGEN SATURATION: 99 % | HEART RATE: 104 BPM | HEIGHT: 64 IN | BODY MASS INDEX: 25.61 KG/M2

## 2018-03-05 DIAGNOSIS — R63.4 WEIGHT LOSS: ICD-10-CM

## 2018-03-05 DIAGNOSIS — Z00.00 ROUTINE GENERAL MEDICAL EXAMINATION AT A HEALTH CARE FACILITY: ICD-10-CM

## 2018-03-05 DIAGNOSIS — I10 ESSENTIAL HYPERTENSION WITH GOAL BLOOD PRESSURE LESS THAN 140/90: Primary | ICD-10-CM

## 2018-03-05 DIAGNOSIS — M54.12 CERVICAL RADICULOPATHY: ICD-10-CM

## 2018-03-05 DIAGNOSIS — E66.3 OVERWEIGHT (BMI 25.0-29.9): ICD-10-CM

## 2018-03-05 DIAGNOSIS — J30.1 NON-SEASONAL ALLERGIC RHINITIS DUE TO POLLEN, UNSPECIFIED CHRONICITY: ICD-10-CM

## 2018-03-05 RX ORDER — CYCLOBENZAPRINE HCL 10 MG
10 TABLET ORAL
Qty: 90 TAB | Refills: 0 | Status: SHIPPED | OUTPATIENT
Start: 2018-03-05 | End: 2018-09-19 | Stop reason: DRUGHIGH

## 2018-03-05 NOTE — MR AVS SNAPSHOT
[address] 
[phone] Patient: Harper Miranda MRN: [id number] :1965 Visit Information Date & Time Provider Department Dept. Phone Encounter #  
 3/5/2018  3:00 PM Zaki Norris MD [facility] [address] [place] [address] Road [id number] Upcoming Health Maintenance Date Due FOBT Q 1 YEAR AGE 50-75 6/10/2016 BREAST CANCER SCRN MAMMOGRAM 3/8/2019 PAP AKA CERVICAL CYTOLOGY 2019 DTaP/Tdap/Td series (2 - Td) 2027 Allergies as of 3/5/2018  Review Complete On: 3/5/2018 By: Zaki Norris MD  
  
 Severity Noted Reaction Type Reactions Clindamycin  2016    Other (comments) Bowel infection. Pcn [Penicillins]    Unknown (comments) Tramadol  2012   Side Effect Nausea Only Current Immunizations  Reviewed on 2017 Name Date Influenza Vaccine 10/28/2013 Influenza Vaccine (Quad) PF 2017  4:16 PM, 11/15/2016 Influenza Vaccine Split 10/26/2012 Influenza Vaccine Whole 10/29/2010 TD Vaccine 11/3/2006 Tdap 2017  4:07 PM  
  
 Not reviewed this visit You Were Diagnosed With   
  
 Codes Comments Essential hypertension with goal blood pressure less than 140/90    -  Primary ICD-10-CM: I10 
ICD-9-CM: 401.9 Cervical radiculopathy     ICD-10-CM: M54.12 
ICD-9-CM: 723.4 Non-seasonal allergic rhinitis due to pollen, unspecified chronicity     ICD-10-CM: J30.1 ICD-9-CM: 477.0 Overweight (BMI 25.0-29. 9)     ICD-10-CM: W97.1 ICD-9-CM: 278.02 Routine general medical examination at a health care facility     ICD-10-CM: Z00.00 ICD-9-CM: V70.0 Weight loss     ICD-10-CM: R63.4 ICD-9-CM: 783.21 Vitals BP Pulse Temp Resp Height(growth percentile) Weight(growth percentile) 128/78 (!) 104 98.4 °F (36.9 °C) (Oral) 12 5' 4\" (1.626 m) 150 lb (68 kg) SpO2 BMI OB Status Smoking Status 99% 25.75 kg/m2 Having regular periods Former Smoker Vitals History BMI and BSA Data Body Mass Index Body Surface Area 25.75 kg/m 2 1.75 m 2 Preferred Pharmacy Pharmacy Name Phone [facility] [phone] Your Updated Medication List  
  
   
This list is accurate as of 3/5/18  3:27 PM.  Always use your most recent med list.  
  
  
  
  
 cholecalciferol (vitamin D3) 2,000 unit Tab Commonly known as:  VITAMIN D3  
1 tablet by mouth daily  
  
 cyclobenzaprine 10 mg tablet Commonly known as:  FLEXERIL Take 1 Tab by mouth three (3) times daily as needed for Muscle Spasm(s). hydroCHLOROthiazide 25 mg tablet Commonly known as:  HYDRODIURIL Take 1 Tab by mouth daily. phentermine 37.5 mg tablet Commonly known as:  ADIPEX-P Take 1 Tab by mouth every morning. Max Daily Amount: 37.5 mg.  
  
 valACYclovir 1 gram tablet Commonly known as:  VALTREX Take 1 tablet by mouth two (2) times a day. Prescriptions Sent to Pharmacy Refills  
 cyclobenzaprine (FLEXERIL) 10 mg tablet 0 Sig: Take 1 Tab by mouth three (3) times daily as needed for Muscle Spasm(s). Class: Normal  
 Pharmacy: [address], [address] [place] [address] #: [phone] Route: Oral  
  
To-Do List   
 Around 09/26/2018 Lab:  CBC WITH AUTOMATED DIFF Around 09/26/2018 Lab:  LIPID PANEL Around 09/26/2018 Lab:  METABOLIC PANEL, COMPREHENSIVE Around 09/26/2018 Lab:  T4, FREE Around 09/26/2018 Lab:  TSH 3RD GENERATION Introducing [facility]! Dear Annalee: Thank you for requesting a RisparmioSuper account. Our records indicate that you already have an active RisparmioSuper account. You can access your account anytime at [email or web address]. [email or web address]/[employer]onate Industries Did you know that you can access your hospital and ER discharge instructions at any time in [employer]America? You can also review all of your test results from your hospital stay or ER visit. Additional Information If you have questions, please visit the Frequently Asked Questions section of the [employer] website at [email or web address] Remember, [employer] is NOT to be used for urgent needs. For medical emergencies, dial 911. Now available from your iPhone and Android! Please provide this summary of care documentation to your next provider. Your primary care clinician is listed as Owen Hartley. Komal Holm. If you have any questions after today's visit, please call [phone].

## 2018-03-05 NOTE — PROGRESS NOTES
Alla Anderson 1965, is a 47 y.o. female, who is seen today for reevaluation of hypertension overweight cervical radiculopathy allergic rhinitis. She was seen fairly recently with influenza and is over that and feeling well again. She continues to use phentermine for overweight and she feels much better after losing about 13 pounds in the last several months. She is down 8 pounds from when I last checked her routine weight August 30. She has cut back on phentermine to every other day and is now eating 1 meal rather than breakfast and lunch and moderates her calories with supper and is exercising. She still has intermittent symptoms of cervical radiculopathy on the right and is wearing a patch in that area and it seems to help her. Past Medical History:   Diagnosis Date    HSV infection     Type 2    IBS (irritable bowel syndrome)      Current Outpatient Prescriptions   Medication Sig Dispense Refill    cyclobenzaprine (FLEXERIL) 10 mg tablet Take 1 Tab by mouth three (3) times daily as needed for Muscle Spasm(s). 90 Tab 0    phentermine (ADIPEX-P) 37.5 mg tablet Take 1 Tab by mouth every morning. Max Daily Amount: 37.5 mg. 30 Tab 5    hydroCHLOROthiazide (HYDRODIURIL) 25 mg tablet Take 1 Tab by mouth daily. 90 Tab 2    cholecalciferol, vitamin D3, (VITAMIN D3) 2,000 unit tab 1 tablet by mouth daily 100 Tab prn    valacyclovir (VALTREX) 1 gram tablet Take 1 tablet by mouth two (2) times a day. 6 tablet 0     Visit Vitals    /78    Pulse (!) 104    Temp 98.4 °F (36.9 °C) (Oral)    Resp 12    Ht 5' 4\" (1.626 m)    Wt 150 lb (68 kg)    SpO2 99%    BMI 25.75 kg/m2     Carotids are 2+ without bruits. Lungs are clear to percussion. Good breath sounds with no wheezing or crackles. Heart reveals a regular rhythm with normal S1-S2 no murmur gallop click or rub. Apical impulse is not palpable. Abdomen is soft and nontender with no hepatosplenomegaly or masses.   There is a very soft epigastric bruit. Strong these reveal no clubbing cyanosis or edema. Pulses are 2+. Assessment: #1. Hypertension and history of edema doing well. She will continue hydrochlorothiazide 25 mg daily. #2.  Overweight improved but still slightly over ideal weight. She will continue current meal pattern and exercise and she may continue to use phentermine every other day as long as she needs to quit at some point she plans to stop using that. #3.  Cervical radiculopathy no longer severe at all, she will be very careful with her neck and since the patch seems to help for the pain she will continue with that. Follow-up in about 6 months for physical    Izaiah Nguyen MD FACP    Please note: This document has been produced using voice recognition software. Unrecognized errors in transcription may be present.

## 2018-03-19 ENCOUNTER — HOSPITAL ENCOUNTER (OUTPATIENT)
Dept: MAMMOGRAPHY | Age: 53
Discharge: HOME | End: 2018-03-19
Attending: INTERNAL MEDICINE
Payer: COMMERCIAL

## 2018-03-19 DIAGNOSIS — Z12.31 SCREENING MAMMOGRAM, ENCOUNTER FOR: ICD-10-CM

## 2018-03-19 PROCEDURE — 77063 BREAST TOMOSYNTHESIS BI: CPT

## 2018-03-20 ENCOUNTER — DOCUMENTATION ONLY (OUTPATIENT)
Dept: SURGERY | Age: 53
End: 2018-03-20

## 2018-03-20 NOTE — PROGRESS NOTES
Patient's lifetime risk for developing breast cancer was calculated using the information supplied by the pt on the [facility]. The Tyrer-Cuzick Model was used. Patient's score came out to be >20%, therefore standards indicate she should have an annual breast MRI ordered in addition to an annual Mammogram.  It is also recommended that the patient have a clinical breast exam every 6 months. Dr. Viola Gottron and Dr. Olman Griffin would be glad to see any patients to enroll them in our high risk program. Please call the Breast Nurse Navigator at [phone] if you have further questions.   (A copy of this note was routed to pt's referring provider.)

## 2018-04-30 ENCOUNTER — TELEPHONE (OUTPATIENT)
Dept: INTERNAL MEDICINE CLINIC | Age: 53
End: 2018-04-30

## 2018-04-30 NOTE — TELEPHONE ENCOUNTER
James Reyes called her today in regards to dried beef that was purchased from beef. It has been recalled and they advised her to contact her dr because she may have been exposed to a pathological bacteria growth. Please advise as to whether or not there is something she needs to do.

## 2018-05-27 RX ORDER — HYDROCHLOROTHIAZIDE 25 MG/1
TABLET ORAL
Qty: 90 TAB | Refills: 2 | Status: SHIPPED | OUTPATIENT
Start: 2018-05-27 | End: 2018-10-17 | Stop reason: SDUPTHER

## 2018-08-10 ENCOUNTER — OFFICE VISIT (OUTPATIENT)
Dept: INTERNAL MEDICINE CLINIC | Age: 53
End: 2018-08-10

## 2018-08-10 ENCOUNTER — HOSPITAL ENCOUNTER (OUTPATIENT)
Dept: LAB | Age: 53
Discharge: HOME | End: 2018-08-10

## 2018-08-10 VITALS
RESPIRATION RATE: 14 BRPM | BODY MASS INDEX: 24.28 KG/M2 | SYSTOLIC BLOOD PRESSURE: 132 MMHG | WEIGHT: 142.2 LBS | DIASTOLIC BLOOD PRESSURE: 88 MMHG | HEIGHT: 64 IN | OXYGEN SATURATION: 98 % | TEMPERATURE: 97.6 F | HEART RATE: 66 BPM

## 2018-08-10 DIAGNOSIS — R39.15 URGENCY OF URINATION: ICD-10-CM

## 2018-08-10 DIAGNOSIS — R35.0 URINE FREQUENCY: Primary | ICD-10-CM

## 2018-08-10 LAB
BILIRUB UR QL STRIP: NEGATIVE
GLUCOSE UR-MCNC: NEGATIVE MG/DL
KETONES P FAST UR STRIP-MCNC: NEGATIVE MG/DL
PH UR STRIP: 7 [PH] (ref 4.6–8)
PROT UR QL STRIP: NEGATIVE
SP GR UR STRIP: 1.01 (ref 1–1.03)
UA UROBILINOGEN AMB POC: NORMAL (ref 0.2–1)
URINALYSIS CLARITY POC: CLEAR
URINALYSIS COLOR POC: NORMAL
URINE BLOOD POC: NORMAL
URINE LEUKOCYTES POC: NEGATIVE
URINE NITRITES POC: NEGATIVE

## 2018-08-10 PROCEDURE — 99001 SPECIMEN HANDLING PT-LAB: CPT | Performed by: NURSE PRACTITIONER

## 2018-08-10 RX ORDER — SULFAMETHOXAZOLE AND TRIMETHOPRIM 800; 160 MG/1; MG/1
1 TABLET ORAL 2 TIMES DAILY
Qty: 6 TAB | Refills: 0 | Status: SHIPPED | OUTPATIENT
Start: 2018-08-10 | End: 2018-08-13

## 2018-08-10 RX ORDER — PHENAZOPYRIDINE HYDROCHLORIDE 100 MG/1
100 TABLET, FILM COATED ORAL
Qty: 9 TAB | Refills: 0 | Status: SHIPPED | OUTPATIENT
Start: 2018-08-10 | End: 2018-08-13

## 2018-08-10 NOTE — PROGRESS NOTES
Tanesha Borja is a 47 y.o.  female and presents with    Chief Complaint   Patient presents with    Urinary Frequency     Patient here for urinary frequency with unable to completly go. Patient reports pressure in the pevlic area and after she goes she feels like she has to go again. Patient reports during urination there is the stop and go effect. x 5 days        Subjective:  HPI   Mrs. Pipkin presents today with frequency, urgency, pressure and discomfort with urination x 2 days. Denies fever, GI symptoms, headaches, flank pain, possible chills today. She has used Tylenol earlier this week. Additional Concerns: none     ROS   Review of Systems   Constitutional: Positive for chills. Negative for fever and malaise/fatigue. Gastrointestinal: Negative for abdominal pain, constipation, diarrhea, nausea and vomiting. Genitourinary: Positive for dysuria, frequency and urgency. Negative for flank pain and hematuria. Musculoskeletal: Negative for back pain. Neurological: Negative for dizziness and headaches. Allergies   Allergen Reactions    Clindamycin Other (comments)     Bowel infection.  Pcn [Penicillins] Unknown (comments)    Tramadol Nausea Only       Current Outpatient Prescriptions   Medication Sig Dispense Refill    trimethoprim-sulfamethoxazole (BACTRIM DS, SEPTRA DS) 160-800 mg per tablet Take 1 Tab by mouth two (2) times a day for 3 days. 6 Tab 0    phenazopyridine (PYRIDIUM) 100 mg tablet Take 1 Tab by mouth three (3) times daily (after meals) for 3 days. 9 Tab 0    hydroCHLOROthiazide (HYDRODIURIL) 25 mg tablet TAKE 1 TABLET DAILY 90 Tab 2    cyclobenzaprine (FLEXERIL) 10 mg tablet Take 1 Tab by mouth three (3) times daily as needed for Muscle Spasm(s). 90 Tab 0    cholecalciferol, vitamin D3, (VITAMIN D3) 2,000 unit tab 1 tablet by mouth daily 100 Tab prn    valacyclovir (VALTREX) 1 gram tablet Take 1 tablet by mouth two (2) times a day.  6 tablet 0    phentermine (ADIPEX-P) 37.5 mg tablet Take 1 Tab by mouth every morning. Max Daily Amount: 37.5 mg. 30 Tab 5       Social History     Social History    Marital status: SINGLE     Spouse name: N/A    Number of children: N/A    Years of education: N/A     Occupational History    Not on file.      Social History Main Topics    Smoking status: Former Smoker     Packs/day: 0.50     Quit date: 1/26/2016    Smokeless tobacco: Never Used    Alcohol use No    Drug use: No    Sexual activity: Yes     Partners: Male     Birth control/ protection: Pill     Other Topics Concern    Not on file     Social History Narrative       Past Medical History:   Diagnosis Date    HSV infection     Type 2    IBS (irritable bowel syndrome)        Past Surgical History:   Procedure Laterality Date    EXCISE BREAST LES W XRAY MARKER Right 1-22-16    Dr. Kiet Fallon Left 1/2/2015    LEFT NIPPLE DUCT EXPLRATION WITH EXCISIONAL BIOPSY performed by Patric Storm MD at [facility] MAIN OR    HX BREAST BIOPSY Right 1/22/2016    WIDE LOCAL EXCISION WITH NEEDLE LOCALIZATION MAMMOGRAM RIGHT BREAST LESION performed by Patric Storm MD at [address] [place] [address] HX COLONOSCOPY  10/03/2016    Normal tissue, 10 years       Family History   Problem Relation Age of Onset    Cancer Mother 62     breast    Breast Cancer Mother 79    Heart Attack Father 48     he was a smoker    Breast Cancer Maternal Aunt 79    Breast Cancer Maternal Grandmother 56       Objective:  Vitals:    08/10/18 1246   BP: 132/88   Pulse: 66   Resp: 14   Temp: 97.6 °F (36.4 °C)   TempSrc: Oral   SpO2: 98%   Weight: 142 lb 3.2 oz (64.5 kg)   Height: 5' 4\" (1.626 m)   PainSc:   0 - No pain   LMP: 06/08/2018       LABS   Results for orders placed or performed in visit on 08/10/18   AMB POC URINALYSIS DIP STICK AUTO W/O MICRO   Result Value Ref Range    Color (UA POC) Light Yellow     Clarity (UA POC) Clear     Glucose (UA POC) Negative Negative    Bilirubin (UA POC) Negative Negative    Ketones (UA POC) Negative Negative    Specific gravity (UA POC) 1.010 1.001 - 1.035    Blood (UA POC) 1+ Negative    pH (UA POC) 7.0 4.6 - 8.0    Protein (UA POC) Negative Negative    Urobilinogen (UA POC) 0.2 mg/dL 0.2 - 1    Nitrites (UA POC) Negative Negative    Leukocyte esterase (UA POC) Negative Negative       TESTS  none    PE  Physical Exam   Constitutional: She is oriented to person, place, and time. She appears well-developed and well-nourished. No distress. HENT:   Head: Normocephalic and atraumatic. Eyes: Conjunctivae are normal. Pupils are equal, round, and reactive to light. Neck: Normal range of motion. Cardiovascular:   No murmur heard. Abdominal: Soft. Bowel sounds are normal. She exhibits no distension. There is no tenderness. There is no CVA tenderness. Neurological: She is alert and oriented to person, place, and time. Skin: Skin is warm and dry. She is not diaphoretic. Psychiatric: She has a normal mood and affect. Her behavior is normal. Judgment and thought content normal.   Vitals reviewed. Assessment/Plan:    1. Cystitis- POC UA blood 1+, negative nitrites, leukocytes, ph 7.0. Urine culture sent, will call with results. Pyridium for symptomatic relief and increase water intake. Report if worsening symptoms and discussed emergent symptoms to report to ED. Lab review: orders written for new lab studies as appropriate; see orders    Today's Visit:   Diagnoses and all orders for this visit:    1. Urine frequency  -     AMB POC URINALYSIS DIP STICK AUTO W/O MICRO  -     CULTURE, URINE; Future  -     trimethoprim-sulfamethoxazole (BACTRIM DS, SEPTRA DS) 160-800 mg per tablet; Take 1 Tab by mouth two (2) times a day for 3 days. -     phenazopyridine (PYRIDIUM) 100 mg tablet; Take 1 Tab by mouth three (3) times daily (after meals) for 3 days. 2. Urgency of urination  -     AMB POC URINALYSIS DIP STICK AUTO W/O MICRO  -     CULTURE, URINE;  Future  - trimethoprim-sulfamethoxazole (BACTRIM DS, SEPTRA DS) 160-800 mg per tablet; Take 1 Tab by mouth two (2) times a day for 3 days. -     phenazopyridine (PYRIDIUM) 100 mg tablet; Take 1 Tab by mouth three (3) times daily (after meals) for 3 days. Health Maintenance: Deferred to PCP. I have discussed the diagnosis with the patient and the intended plan as seen in the above orders. The patient has received an after-visit summary and questions were answered concerning future plans. I have discussed medication side effects and warnings with the patient as well. I have reviewed the plan of care with the patient, accepted their input and they are in agreement with the treatment goals. Follow-up Disposition: Not on File   More than 1/2 of this 15 minute visit was spent in counseling and coordination of care, as described above.     KATELYNN Tran  Internist of [address], [address] [place] [address].  Phone: [phone]  Fax: [phone]

## 2018-08-10 NOTE — MR AVS SNAPSHOT
[address] [facility] 
 
 
 [address], [address] [facility] 
[phone] Patient: Murali Mcghee MRN: [id number] :1965 Visit Information Date & Time Provider Department Dept. Phone Encounter #  
 8/10/2018 12:45 PM Breanna Adair NP [facility] [phone] Your Appointments 2018  3:00 PM  
PHYSICAL with Jaspreet Haynes MD  
[facility]) Appt Note: rpe  
 [address] [facility], [facility] [address] [address] [place]  
  
   
 [address], [address] Upcoming Health Maintenance Date Due FOBT Q 1 YEAR AGE 50-75 6/10/2016 Influenza Age 5 to Adult 2018 PAP AKA CERVICAL CYTOLOGY 2019 BREAST CANCER SCRN MAMMOGRAM 3/19/2020 DTaP/Tdap/Td series (2 - Td) 2027 Allergies as of 8/10/2018  Review Complete On: 8/10/2018 By: Zechariah Arroyo Severity Noted Reaction Type Reactions Clindamycin  2016    Other (comments) Bowel infection. Pcn [Penicillins]    Unknown (comments) Tramadol  2012   Side Effect Nausea Only Current Immunizations  Reviewed on 2017 Name Date Influenza Vaccine 10/28/2013 Influenza Vaccine (Quad) PF 2017  4:16 PM, 11/15/2016 Influenza Vaccine Split 10/26/2012 Influenza Vaccine Whole 10/29/2010 TD Vaccine 11/3/2006 Tdap 2017  4:07 PM  
  
 Not reviewed this visit You Were Diagnosed With   
  
 Codes Comments Urine frequency    -  Primary ICD-10-CM: R35.0 ICD-9-CM: 788.41 Urgency of urination     ICD-10-CM: R39.15 ICD-9-CM: 290.76 Vitals BP Pulse Temp Resp Height(growth percentile) Weight(growth percentile) 132/88 (BP 1 Location: Left arm, BP Patient Position: Sitting) 66 97.6 °F (36.4 °C) (Oral) 14 5' 4\" (1.626 m) 142 lb 3.2 oz (64.5 kg) LMP SpO2 BMI OB Status Smoking Status 06/08/2018 (Within Days) 98% 24.41 kg/m2 Having regular periods Former Smoker Vitals History BMI and BSA Data Body Mass Index Body Surface Area  
 24.41 kg/m 2 1.71 m 2 Preferred Pharmacy Pharmacy Name Phone [address] [phone] Your Updated Medication List  
  
   
This list is accurate as of 8/10/18  1:14 PM.  Always use your most recent med list.  
  
  
  
  
 cholecalciferol (vitamin D3) 2,000 unit Tab Commonly known as:  VITAMIN D3  
1 tablet by mouth daily  
  
 cyclobenzaprine 10 mg tablet Commonly known as:  FLEXERIL Take 1 Tab by mouth three (3) times daily as needed for Muscle Spasm(s). hydroCHLOROthiazide 25 mg tablet Commonly known as:  HYDRODIURIL  
TAKE 1 TABLET DAILY phenazopyridine 100 mg tablet Commonly known as:  PYRIDIUM Take 1 Tab by mouth three (3) times daily (after meals) for 3 days. phentermine 37.5 mg tablet Commonly known as:  ADIPEX-P Take 1 Tab by mouth every morning. Max Daily Amount: 37.5 mg.  
  
 trimethoprim-sulfamethoxazole 160-800 mg per tablet Commonly known as:  BACTRIM DS, SEPTRA DS Take 1 Tab by mouth two (2) times a day for 3 days. valACYclovir 1 gram tablet Commonly known as:  VALTREX Take 1 tablet by mouth two (2) times a day. Prescriptions Sent to Pharmacy Refills  
 trimethoprim-sulfamethoxazole (BACTRIM DS, SEPTRA DS) 160-800 mg per tablet 0 Sig: Take 1 Tab by mouth two (2) times a day for 3 days. Class: Normal  
 Pharmacy: [address] Ph #: [phone] Route: Oral  
 phenazopyridine (PYRIDIUM) 100 mg tablet 0 Sig: Take 1 Tab by mouth three (3) times daily (after meals) for 3 days. Class: Normal  
 Pharmacy: [address] Ph #: [phone] Route: Oral  
  
We Performed the Following AMB POC URINALYSIS DIP STICK AUTO W/O MICRO [26411 CPT(R)] To-Do List   
 08/10/2018 Microbiology:  CULTURE, URINE Introducing [facility] SERVICES! Dear Heber Mckeon: Thank you for requesting a [email or web address] account. Our records indicate that you already have an active [email or web address] account. You can access your account anytime at [email or web address] Did you know that you can access your hospital and ER discharge instructions at any time in [email or web address]? You can also review all of your test results from your hospital stay or ER visit. Additional Information If you have questions, please visit the Frequently Asked Questions section of the [email or web address] website at [email or web address] Remember, [email or web address] is NOT to be used for urgent needs. For medical emergencies, dial 911. Now available from your iPhone and Android! Please provide this summary of care documentation to your next provider. Your primary care clinician is listed as Dalton Weaver. Mika Harrison. If you have any questions after today's visit, please call [phone].

## 2018-08-10 NOTE — PROGRESS NOTES
1. Have you been to the ER, urgent care clinic or hospitalized since your last visit? NO.     2. Have you seen or consulted any other health care providers outside of the [address] [place] [address] since your last visit (Include any pap smears or colon screening)? NO      Do you have an Advanced Directive? NO    Would you like information on Advanced Directives?  NO

## 2018-08-12 LAB — BACTERIA UR CULT: NO GROWTH

## 2018-08-13 ENCOUNTER — TELEPHONE (OUTPATIENT)
Dept: INTERNAL MEDICINE CLINIC | Age: 53
End: 2018-08-13

## 2018-08-13 NOTE — TELEPHONE ENCOUNTER
----- Message from Jameel Matute NP sent at 8/13/2018 11:55 AM EDT -----  Urine culture showed no growth which means did not have UTI

## 2018-08-15 ENCOUNTER — TELEPHONE (OUTPATIENT)
Dept: INTERNAL MEDICINE CLINIC | Age: 53
End: 2018-08-15

## 2018-08-15 NOTE — TELEPHONE ENCOUNTER
Patient thinks she passed a kidney stone today. She wants to know if the stone needs to be sent off for analysis.

## 2018-08-24 ENCOUNTER — TELEPHONE (OUTPATIENT)
Dept: INTERNAL MEDICINE CLINIC | Age: 53
End: 2018-08-24

## 2018-09-04 NOTE — TELEPHONE ENCOUNTER
Called patient back about concerns of PAP, Patient says she was not calling about a pap. She was calling about information on gallstones.  Patient has spoken with Dr. Sagar Reese and and instructed for treatment and has an appt for 9/11 at 3pm

## 2018-09-05 ENCOUNTER — HOSPITAL ENCOUNTER (OUTPATIENT)
Dept: LAB | Age: 53
Discharge: HOME | End: 2018-09-05

## 2018-09-05 LAB — XX-LABCORP SPECIMEN COL,LCBCF: NORMAL

## 2018-09-05 PROCEDURE — 99001 SPECIMEN HANDLING PT-LAB: CPT | Performed by: INTERNAL MEDICINE

## 2018-09-06 LAB
ALBUMIN SERPL-MCNC: 4.1 G/DL (ref 3.5–5.5)
ALBUMIN/GLOB SERPL: 2 {RATIO} (ref 1.2–2.2)
ALP SERPL-CCNC: 52 IU/L (ref 39–117)
ALT SERPL-CCNC: 12 IU/L (ref 0–32)
AST SERPL-CCNC: 16 IU/L (ref 0–40)
BASOPHILS # BLD AUTO: 0 X10E3/UL (ref 0–0.2)
BASOPHILS NFR BLD AUTO: 0 %
BILIRUB SERPL-MCNC: 0.4 MG/DL (ref 0–1.2)
BUN SERPL-MCNC: 9 MG/DL (ref 6–24)
BUN/CREAT SERPL: 12 (ref 9–23)
CALCIUM SERPL-MCNC: 9.2 MG/DL (ref 8.7–10.2)
CHLORIDE SERPL-SCNC: 102 MMOL/L (ref 96–106)
CHOLEST SERPL-MCNC: 125 MG/DL (ref 100–199)
CO2 SERPL-SCNC: 26 MMOL/L (ref 20–29)
CREAT SERPL-MCNC: 0.75 MG/DL (ref 0.57–1)
EOSINOPHIL # BLD AUTO: 0.1 X10E3/UL (ref 0–0.4)
EOSINOPHIL NFR BLD AUTO: 1 %
ERYTHROCYTE [DISTWIDTH] IN BLOOD BY AUTOMATED COUNT: 13.8 % (ref 12.3–15.4)
GLOBULIN SER CALC-MCNC: 2.1 G/DL (ref 1.5–4.5)
GLUCOSE SERPL-MCNC: 84 MG/DL (ref 65–99)
HCT VFR BLD AUTO: 40 % (ref 34–46.6)
HDLC SERPL-MCNC: 63 MG/DL
HGB BLD-MCNC: 12.7 G/DL (ref 11.1–15.9)
IMM GRANULOCYTES # BLD: 0 X10E3/UL (ref 0–0.1)
IMM GRANULOCYTES NFR BLD: 0 %
INTERPRETATION, 910389: NORMAL
LDLC SERPL CALC-MCNC: 47 MG/DL (ref 0–99)
LYMPHOCYTES # BLD AUTO: 1.8 X10E3/UL (ref 0.7–3.1)
LYMPHOCYTES NFR BLD AUTO: 26 %
MCH RBC QN AUTO: 30.3 PG (ref 26.6–33)
MCHC RBC AUTO-ENTMCNC: 31.8 G/DL (ref 31.5–35.7)
MCV RBC AUTO: 96 FL (ref 79–97)
MONOCYTES # BLD AUTO: 0.5 X10E3/UL (ref 0.1–0.9)
MONOCYTES NFR BLD AUTO: 7 %
NEUTROPHILS # BLD AUTO: 4.5 X10E3/UL (ref 1.4–7)
NEUTROPHILS NFR BLD AUTO: 66 %
PLATELET # BLD AUTO: 268 X10E3/UL (ref 150–379)
POTASSIUM SERPL-SCNC: 4.2 MMOL/L (ref 3.5–5.2)
PROT SERPL-MCNC: 6.2 G/DL (ref 6–8.5)
RBC # BLD AUTO: 4.19 X10E6/UL (ref 3.77–5.28)
SODIUM SERPL-SCNC: 139 MMOL/L (ref 134–144)
T4 FREE SERPL-MCNC: 1.08 NG/DL (ref 0.82–1.77)
TRIGL SERPL-MCNC: 76 MG/DL (ref 0–149)
TSH SERPL DL<=0.005 MIU/L-ACNC: 3.89 UIU/ML (ref 0.45–4.5)
VLDLC SERPL CALC-MCNC: 15 MG/DL (ref 5–40)
WBC # BLD AUTO: 6.9 X10E3/UL (ref 3.4–10.8)

## 2018-09-11 ENCOUNTER — OFFICE VISIT (OUTPATIENT)
Dept: INTERNAL MEDICINE CLINIC | Age: 53
End: 2018-09-11

## 2018-09-11 VITALS
BODY MASS INDEX: 25.44 KG/M2 | RESPIRATION RATE: 14 BRPM | TEMPERATURE: 98.8 F | HEIGHT: 64 IN | OXYGEN SATURATION: 98 % | WEIGHT: 149 LBS | SYSTOLIC BLOOD PRESSURE: 138 MMHG | DIASTOLIC BLOOD PRESSURE: 72 MMHG | HEART RATE: 90 BPM

## 2018-09-11 DIAGNOSIS — N20.0 NEPHROLITHIASIS: ICD-10-CM

## 2018-09-11 DIAGNOSIS — Z00.00 ROUTINE GENERAL MEDICAL EXAMINATION AT A HEALTH CARE FACILITY: Primary | ICD-10-CM

## 2018-09-11 DIAGNOSIS — I10 ESSENTIAL HYPERTENSION WITH GOAL BLOOD PRESSURE LESS THAN 140/90: ICD-10-CM

## 2018-09-11 DIAGNOSIS — Z23 ENCOUNTER FOR IMMUNIZATION: ICD-10-CM

## 2018-09-11 NOTE — PROGRESS NOTES
Gilberto Daniels 1965, is a 47 y.o. female, who is seen today for routine physical exam and follow-up on hypertension kidney stone weight gain former smoker. She quit smoking about 2 years ago and has not gone back to smoking. She did gain weight is not happy about that but started phentermine year ago and lost 9 pounds since that time though no change in the last 6 months and she has been not using phentermine. She eats a healthy diet remains active and feels well. She passed a kidney stone recently that was calcium phosphate and calcium oxalate. She has never had a kidney stone before. She has hypertension and takes hydrochlorothiazide for that. She has remote history of herpes simplex and has used valacyclovir when needed but has not needed that recently. Because of her mother having breast cancer she is very careful about keeping up on follow-up for that, last mammogram was normal a few months ago. Past Medical History:  
Diagnosis Date  HSV infection Type 2  
 IBS (irritable bowel syndrome) Past Surgical History:  
Procedure Laterality Date  EXCISE BREAST LES W XRAY MARKER Right 1-22-16 Dr. Sushma Mehta  HX BREAST BIOPSY Left 1/2/2015 LEFT NIPPLE DUCT EXPLRATION WITH EXCISIONAL BIOPSY performed by Glenn Swanson MD at [address] [place] [address] HX BREAST BIOPSY Right 1/22/2016 WIDE LOCAL EXCISION WITH NEEDLE LOCALIZATION MAMMOGRAM RIGHT BREAST LESION performed by Glenn Swanson MD at [address] [place] [address] HX COLONOSCOPY  10/03/2016 Normal tissue, 10 years Current Outpatient Prescriptions Medication Sig Dispense Refill  hydroCHLOROthiazide (HYDRODIURIL) 25 mg tablet TAKE 1 TABLET DAILY 90 Tab 2  cyclobenzaprine (FLEXERIL) 10 mg tablet Take 1 Tab by mouth three (3) times daily as needed for Muscle Spasm(s). 90 Tab 0  cholecalciferol, vitamin D3, (VITAMIN D3) 2,000 unit tab 1 tablet by mouth daily 100 Tab prn  valacyclovir (VALTREX) 1 gram tablet Take 1 tablet by mouth two (2) times a day. 6 tablet 0 Allergies Allergen Reactions  Clindamycin Other (comments) Bowel infection.  Pcn [Penicillins] Unknown (comments)  Tramadol Nausea Only Social History Social History  Marital status: SINGLE Spouse name: N/A  
 Number of children: N/A  
 Years of education: N/A Social History Main Topics  Smoking status: Former Smoker Packs/day: 0.50 Quit date: 1/26/2016  Smokeless tobacco: Never Used  Alcohol use No  
 Drug use: No  
 Sexual activity: Yes  
  Partners: Male Birth control/ protection: Pill Other Topics Concern  None Social History Narrative Visit Vitals  /72 (BP 1 Location: Right arm, BP Patient Position: Sitting)  Pulse 90  Temp 98.8 °F (37.1 °C) (Oral)  Resp 14  
 Ht 5' 4\" (1.626 m)  Wt 149 lb (67.6 kg)  SpO2 98%  BMI 25.58 kg/m2 The patient is a well-developed well-nourished female in no apparent distress. HEENT: Pupils are equal and react to light and extraocular movements are full. Ear canals and tympanic membranes appear normal. Oral cavity appears normal with no oral lesions. Neck: Carotids are 2+ without bruits. No adenopathy or thyromegaly. Lungs are clear to percussion. I hear no wheezing, rales or rhonchi. Heart reveals a regular rhythm with no murmur, gallop, click or rub. The apical impulse is in the fifth interspace at the midclavicular line. Abdomen is soft and nontender with no hepatosplenomegaly or masses. Bowel sounds are normoactive and there is no distention or tympany. Extremities reveal no clubbing cyanosis or edema. Pulses are 2+. Skin reveals no suspicious skin growths. Breasts reveal no masses, skin or nipple abnormalities. No axillary adenopathy. Results for orders placed or performed during the hospital encounter of 09/05/18 [facility] SPECIMEN COL Result Value Ref Range [facility] SPECIMEN COLLN. [facility]. Please direct inquiries to ([phone]). Recent laboratory shows hemoglobin 12.7, glucose 84, other chemistries normal, LDL cholesterol 47 and TSH 3.89. Assessment: #1. Hypertension is adequately controlled. She will continue hydrochlorothiazide 25 mg daily. She will continue no added salt diet. #2.  History of weight gain after quitting smoking, her weight is down into the near normal range at body mass index of 25.6 told her that she could lose a few more pounds if possible but current weight is healthy for her. #3.  Recent kidney stone, calcium oxalate and calcium phosphate, I have instructed her to keep up on fluids so that she urinates about every 3 hours during the daytime and she has another kidney stone further evaluation will be needed and probably increase the dosage of hydrochlorothiazide. We discussed that at some length today. #4.  Previous history of herpes simplex virus, she will use valacyclovir 1 g twice a day as needed in the future. Family history of breast cancer, her mother has history of breast cancer. She will continue routine screening. On every Follow-up in 6 months. She will get a flu shot now. At her physical in 1 year she will be due for Pap smear and at that time we will do code testing for HPV and if these are normal she will need another pelvic exam for 5 years unless symptoms develop. We discussed this today and she is happy about that. Izaiah Reese, [address] Please note: This document has been produced using voice recognition software. Unrecognized errors in transcription may be present.

## 2018-09-11 NOTE — MR AVS SNAPSHOT
[address] [facility] 
 
 
 [address], [address] [facility] [address] [facility] 
[phone] Patient: Nataliya Holm MRN: [id number] :1965 Visit Information Date & Time Provider Department Dept. Phone Encounter #  
 2018  3:00 PM Windy Galarza MD [facility] [address] [place] [address] [phone] [id number] Follow-up Instructions Return in about 6 months (around 3/11/2019). Your Appointments 3/12/2019  3:30 PM  
Office Visit with Windy Galarza MD  
[facility] [address] [place] [address] [facility]) Appt Note: 6 month follow up  
 [address] [employer], [address] [place]  
  
   
 [address], [address] Upcoming Health Maintenance Date Due Influenza Age 5 to Adult 2018 PAP AKA CERVICAL CYTOLOGY 2019 BREAST CANCER SCRN MAMMOGRAM 3/19/2020 COLONOSCOPY 10/3/2026 DTaP/Tdap/Td series (2 - Td) 2027 Allergies as of 2018  Review Complete On: 2018 By: Windy Galarza MD  
  
 Severity Noted Reaction Type Reactions Clindamycin  2016    Other (comments) Bowel infection. Pcn [Penicillins]    Unknown (comments) Tramadol  2012   Side Effect Nausea Only Current Immunizations  Reviewed on 2018 Name Date Influenza Vaccine 10/28/2013 Influenza Vaccine (Quad) PF  Incomplete, 2017  4:16 PM, 11/15/2016 Influenza Vaccine Split 10/26/2012 Influenza Vaccine Whole 10/29/2010 TD Vaccine 11/3/2006 Tdap 2017  4:07 PM  
  
 Reviewed by Windy Galarza MD on 2018 at  3:54 PM  
You Were Diagnosed With   
  
 Codes Comments Routine general medical examination at a health care facility    -  Primary ICD-10-CM: Z00.00 ICD-9-CM: V70.0 Essential hypertension with goal blood pressure less than 140/90     ICD-10-CM: I10 
ICD-9-CM: 401.9 Nephrolithiasis     ICD-10-CM: N20.0 ICD-9-CM: 592.0 Encounter for immunization     ICD-10-CM: B22 ICD-9-CM: V03.89 Vitals BP Pulse Temp Resp Height(growth percentile) Weight(growth percentile) 138/72 (BP 1 Location: Right arm, BP Patient Position: Sitting) 90 98.8 °F (37.1 °C) (Oral) 14 5' 4\" (1.626 m) 149 lb (67.6 kg) SpO2 BMI OB Status Smoking Status 98% 25.58 kg/m2 Having regular periods Former Smoker Vitals History BMI and BSA Data Body Mass Index Body Surface Area 25.58 kg/m 2 1.75 m 2 Preferred Pharmacy Pharmacy Name Phone [facility], [address] [place] [address] [phone] Your Updated Medication List  
  
   
This list is accurate as of 9/11/18  4:05 PM.  Always use your most recent med list.  
  
  
  
  
 cholecalciferol (vitamin D3) 2,000 unit Tab Commonly known as:  VITAMIN D3  
1 tablet by mouth daily  
  
 cyclobenzaprine 10 mg tablet Commonly known as:  FLEXERIL Take 1 Tab by mouth three (3) times daily as needed for Muscle Spasm(s). hydroCHLOROthiazide 25 mg tablet Commonly known as:  HYDRODIURIL  
TAKE 1 TABLET DAILY  
  
 valACYclovir 1 gram tablet Commonly known as:  VALTREX Take 1 tablet by mouth two (2) times a day. We Performed the Following INFLUENZA VIRUS VAC QUAD,SPLIT,PRESV FREE SYRINGE IM [id number] CPT(R)] Follow-up Instructions Return in about 6 months (around 3/11/2019). Introducing hospitals & HEALTH SERVICES! Dear Stephanie Alejandra: Thank you for requesting a KROGNI account. Our records indicate that you already have an active KROGNI account. You can access your account anytime at [email or web address] Did you know that you can access your hospital and ER discharge instructions at any time in KROGNI? You can also review all of your test results from your hospital stay or ER visit. Additional Information If you have questions, please visit the Frequently Asked Questions section of the [employer] website at [email or web address] Remember, [employer] is NOT to be used for urgent needs. For medical emergencies, dial 911. Now available from your iPhone and Android! Please provide this summary of care documentation to your next provider. Your primary care clinician is listed as Arthur Almodovar. Jackelyn No. If you have any questions after today's visit, please call [phone].

## 2018-09-11 NOTE — PROGRESS NOTES
1. Have you been to the ER, urgent care clinic or hospitalized since your last visit? NO 
      
 
2. Have you seen or consulted any other health care providers outside of the [facility] since your last visit (Include any pap smears or colon screening)? NO Do you have an Advanced Directive? NO Would you like information on Advanced Directives?  NO

## 2018-09-19 RX ORDER — CYCLOBENZAPRINE HCL 5 MG
5 TABLET ORAL
Qty: 30 TAB | Refills: 0 | Status: SHIPPED | OUTPATIENT
Start: 2018-09-19 | End: 2019-03-09 | Stop reason: SDUPTHER

## 2018-09-26 DIAGNOSIS — Z00.00 ROUTINE GENERAL MEDICAL EXAMINATION AT A HEALTH CARE FACILITY: ICD-10-CM

## 2018-09-26 DIAGNOSIS — R63.4 WEIGHT LOSS: ICD-10-CM

## 2018-10-17 RX ORDER — HYDROCHLOROTHIAZIDE 25 MG/1
25 TABLET ORAL DAILY
Qty: 90 TAB | Refills: 2 | Status: SHIPPED | OUTPATIENT
Start: 2018-10-17 | End: 2019-07-03 | Stop reason: SDUPTHER

## 2019-03-11 RX ORDER — CYCLOBENZAPRINE HCL 5 MG
5 TABLET ORAL
Qty: 30 TAB | Refills: 0 | Status: SHIPPED | OUTPATIENT
Start: 2019-03-11 | End: 2019-04-17 | Stop reason: SDUPTHER

## 2019-03-12 ENCOUNTER — OFFICE VISIT (OUTPATIENT)
Dept: INTERNAL MEDICINE CLINIC | Age: 54
End: 2019-03-12

## 2019-03-12 VITALS
RESPIRATION RATE: 12 BRPM | BODY MASS INDEX: 26.19 KG/M2 | DIASTOLIC BLOOD PRESSURE: 86 MMHG | HEIGHT: 64 IN | HEART RATE: 77 BPM | OXYGEN SATURATION: 99 % | SYSTOLIC BLOOD PRESSURE: 138 MMHG | WEIGHT: 153.4 LBS | TEMPERATURE: 96.8 F

## 2019-03-12 DIAGNOSIS — I10 ESSENTIAL HYPERTENSION WITH GOAL BLOOD PRESSURE LESS THAN 140/90: Primary | ICD-10-CM

## 2019-03-12 DIAGNOSIS — M54.12 CERVICAL RADICULOPATHY: ICD-10-CM

## 2019-03-12 DIAGNOSIS — M25.511 ACUTE PAIN OF RIGHT SHOULDER: ICD-10-CM

## 2019-03-12 DIAGNOSIS — Z12.31 SCREENING MAMMOGRAM, ENCOUNTER FOR: ICD-10-CM

## 2019-03-12 RX ORDER — MELOXICAM 15 MG/1
15 TABLET ORAL DAILY
Qty: 90 TAB | Refills: 3 | Status: SHIPPED | OUTPATIENT
Start: 2019-03-12 | End: 2020-04-19

## 2019-03-12 NOTE — PROGRESS NOTES
Chief Complaint   Patient presents with    Hypertension     6 month follow up       1. Have you been to the ER, urgent care clinic since your last visit? Hospitalized since your last visit? No    2. Have you seen or consulted any other health care providers outside of the [address] [place] [address] since your last visit? Include any pap smears or colon screening. No    Patient was given a copy of the Advanced Directive and understands to bring it in once completed.   Health Maintenance Due   Topic Date Due    Shingrix Vaccine Age 49> (1 of 2) 06/10/2016

## 2019-03-12 NOTE — PROGRESS NOTES
Briana Peters 1965, is a 47 y.o. female, who is seen today for reevaluation of hypertension and cervical radiculopathy in mild overweight but also complaining of right shoulder pain. For at least several weeks the right shoulder is hurting more than her neck but her neck still hurts. She is unsure of the shoulder hurts at the same time as the neck. She also has some discomfort just lateral to the scapula in the muscles but not medial.  She is taking her medicine regularly and using ibuprofen as needed for the pain. Past Medical History:   Diagnosis Date    HSV infection     Type 2    IBS (irritable bowel syndrome)      Current Outpatient Medications   Medication Sig Dispense Refill    cyclobenzaprine (FLEXERIL) 5 mg tablet Take 1 Tab by mouth three (3) times daily as needed for Muscle Spasm(s). 30 Tab 0    hydroCHLOROthiazide (HYDRODIURIL) 25 mg tablet Take 1 Tab by mouth daily. 90 Tab 2    cholecalciferol, vitamin D3, (VITAMIN D3) 2,000 unit tab 1 tablet by mouth daily 100 Tab prn    valacyclovir (VALTREX) 1 gram tablet Take 1 tablet by mouth two (2) times a day. 6 tablet 0     Visit Vitals  /86 (BP 1 Location: Left arm, BP Patient Position: Sitting)   Pulse 77   Temp 96.8 °F (36 °C) (Oral)   Resp 12   Ht 5' 4\" (1.626 m)   Wt 153 lb 6.4 oz (69.6 kg)   SpO2 99%   BMI 26.33 kg/m²     Carotids are 2+ without bruits. Neck is supple with very good range of motion and with downward pressure on the extended neck there is no discomfort in the neck shoulder or arm. Lungs are clear to percussion. Good breath sounds with no wheezing or crackles. Heart reveals a regular rhythm with normal S1 and S2 no murmur gallop click or rub. Apical impulse is not palpable. Abdomen is soft and nontender with no hepatosplenic megaly or masses and no bruits. Extremities reveal no clubbing cyanosis or edema. Pulses are 2+.   With range of motion of the right shoulder there is no discomfort tenderness is present. Assessment: #1. Hypertension blood pressure adequately controlled. She will continue no added salt diet and hydrochlorothiazide 25 mg daily. #2.  History of cervical radiculopathy still has some neck pain but without radicular symptoms. #3. She now has right shoulder pain that does not seem to be radicular but not really much discomfort with range of motion. I will have her see a specialist for that. I will also switch from as needed ibuprofen to meloxicam 15 mg daily and see how that works. #4.  Mild overweight little change, she will continue to eat healthy and keep her weight from going higher. Follow-up 6 months for a physical or sooner if needed    Izaiah Preciado MD FACP    Please note: This document has been produced using voice recognition software. Unrecognized errors in transcription may be present.

## 2019-03-12 NOTE — PATIENT INSTRUCTIONS
Patient was given a copy of the Advanced Medical Directive Form, and understands to bring it in once completed.   Health Maintenance Due   Topic Date Due    Shingrix Vaccine Age 49> (1 of 2) 06/10/2016

## 2019-03-29 ENCOUNTER — OFFICE VISIT (OUTPATIENT)
Dept: ORTHOPEDIC SURGERY | Facility: CLINIC | Age: 54
End: 2019-03-29

## 2019-03-29 VITALS
DIASTOLIC BLOOD PRESSURE: 85 MMHG | WEIGHT: 153.2 LBS | BODY MASS INDEX: 26.15 KG/M2 | TEMPERATURE: 98.1 F | HEIGHT: 64 IN | OXYGEN SATURATION: 100 % | RESPIRATION RATE: 18 BRPM | SYSTOLIC BLOOD PRESSURE: 159 MMHG | HEART RATE: 80 BPM

## 2019-03-29 DIAGNOSIS — M25.511 CHRONIC RIGHT SHOULDER PAIN: ICD-10-CM

## 2019-03-29 DIAGNOSIS — G89.29 CHRONIC RIGHT SHOULDER PAIN: ICD-10-CM

## 2019-03-29 DIAGNOSIS — M25.511 RIGHT SHOULDER PAIN, UNSPECIFIED CHRONICITY: Primary | ICD-10-CM

## 2019-03-29 NOTE — PROGRESS NOTES
Patient: Anabella Patton                MRN: [id number]       SSN: [id number] YOB: 1965        AGE: 47 y.o. SEX: female Body mass index is 26.3 kg/m². PCP: Luciano Ring MD 
03/29/19 Chief Complaint: Right shoulder pain HISTORY OF PRESENT ILLNESS:   Damian Junior is a 47year-old female who comes to the office today with right shoulder pain. She has a history of neck pain. The pain is located over the posterior aspect of her shoulder. She denies any trauma or injury. She says she has pain when she sleeps on it or wears a backpack. She describes it as an achy pain. Due to some pain in her neck, she has had to stop playing golf, but she says that is not because of her shoulder. She rates the pain as 3/10 today. She has tried heat, a TENS unit, cream, antiinflammatories and Flexeril. She has not had any physical therapy or injections. Past Medical History:  
Diagnosis Date  HSV infection Type 2  
 Hypertension  IBS (irritable bowel syndrome) Family History Problem Relation Age of Onset  Cancer Mother 62  
     breast  
 Breast Cancer Mother 79  
 Heart Attack Father 48  
     he was a smoker  Breast Cancer Maternal Aunt 79  Breast Cancer Maternal Grandmother 64 Current Outpatient Medications Medication Sig Dispense Refill  meloxicam (MOBIC) 15 mg tablet Take 1 Tab by mouth daily. 90 Tab 3  cyclobenzaprine (FLEXERIL) 5 mg tablet Take 1 Tab by mouth three (3) times daily as needed for Muscle Spasm(s). 30 Tab 0  
 hydroCHLOROthiazide (HYDRODIURIL) 25 mg tablet Take 1 Tab by mouth daily. 90 Tab 2  cholecalciferol, vitamin D3, (VITAMIN D3) 2,000 unit tab 1 tablet by mouth daily 100 Tab prn  valacyclovir (VALTREX) 1 gram tablet Take 1 tablet by mouth two (2) times a day. 6 tablet 0 Allergies Allergen Reactions  Clindamycin Other (comments) Bowel infection.  Pcn [Penicillins] Unknown (comments)  Tramadol Nausea Only Past Surgical History:  
Procedure Laterality Date  EXCISE BREAST LES W XRAY MARKER Right 1-22-16 Dr. Markell Lopez  HX BREAST BIOPSY Left 1/2/2015 LEFT NIPPLE DUCT EXPLRATION WITH EXCISIONAL BIOPSY performed by Luciano Jara MD at [address] [place] [address] BREAST BIOPSY Right 1/22/2016 WIDE LOCAL EXCISION WITH NEEDLE LOCALIZATION MAMMOGRAM RIGHT BREAST LESION performed by Luciano Jara MD at [address] [place] [address] HX COLONOSCOPY  10/03/2016 Normal tissue, 10 years Social History Socioeconomic History  Marital status: SINGLE Spouse name: Not on file  Number of children: Not on file  Years of education: Not on file  Highest education level: Not on file Occupational History  Not on file Social Needs  Financial resource strain: Not on file  Food insecurity:  
  Worry: Not on file Inability: Not on file  Transportation needs:  
  Medical: Not on file Non-medical: Not on file Tobacco Use  Smoking status: Former Smoker Packs/day: 1.00 Years: 25.00 Pack years: 25.00 Types: Cigarettes Last attempt to quit: 1/26/2016 Years since quitting: 3.1  Smokeless tobacco: Never Used Substance and Sexual Activity  Alcohol use: Yes Comment: rare  Drug use: No  
 Sexual activity: Yes  
  Partners: Male Birth control/protection: Pill Lifestyle  Physical activity:  
  Days per week: Not on file Minutes per session: Not on file  Stress: Not on file Relationships  Social connections:  
  Talks on phone: Not on file Gets together: Not on file Attends Buddhism service: Not on file Active member of club or organization: Not on file Attends meetings of clubs or organizations: Not on file Relationship status: Not on file  Intimate partner violence:  
  Fear of current or ex partner: Not on file Emotionally abused: Not on file Physically abused: Not on file Forced sexual activity: Not on file Other Topics Concern  Not on file Social History Narrative  Not on file REVIEW OF SYSTEMS:   
 
CON: negative for recent weight loss/gain, fever, or chills EYE: negative for double or blurry vision ENT: negative for hoarseness RS:   negative for cough, URI, SOB 
CV:  negative for chest pain, palpitations GI:    negative for blood in stool, nausea/vomiting :  negative for blood in urine MS: As per HPI Other systems reviewed and noted below. PHYSICAL EXAMINATION: 
Visit Vitals /85 Pulse 80 Temp 98.1 °F (36.7 °C) (Oral) Resp 18 Ht 5' 4\" (1.626 m) Wt 153 lb 3.2 oz (69.5 kg) SpO2 100% BMI 26.30 kg/m² Body mass index is 26.3 kg/m². GENERAL: Alert and oriented x3, in no acute distress, well-developed, well-nourished. HEENT: Normocephalic, atraumatic. RESP: Non labored breathing with equal chest rise on inspiration. CV: Well perfused extremities. No cyanosis or clubbing noted. ABDOMEN: Soft, non-tender, non-distended. PHYSICAL EXAMINATION:   Physical exam of the right shoulder was relatively benign. She has full shoulder range of motion. She has mild pain with capsular stretch with cross body adduction posteriorly, but otherwise rotator cuff, biceps, deltoid, triceps, strength testing is within normal limits without any pain. Full motion. Nontender over the [facility] joint. Exam of her cervical spine was also relatively benign. I do not appreciate any focal neurological deficits. She does not have any tenderness to palpation. Full neck range of motion. IMAGING:   X-rays of the right shoulder were taken in the office today. They do not show any acute or chronic bony abnormalities. ASSESSMENT/PLAN:   Evon Delcid is a 47year-old female with right shoulder pain.   Honestly, the only thing on exam is some possible posterior capsular stiffness. I have recommended a home exercise program for this. I will see her back in six weeks to see how she is doing.    
 
 
 
 
 
 
Electronically signed by: Radha Carson MD

## 2019-04-17 RX ORDER — CYCLOBENZAPRINE HCL 5 MG
TABLET ORAL
Qty: 30 TAB | Refills: 0 | Status: SHIPPED | OUTPATIENT
Start: 2019-04-17 | End: 2020-03-13 | Stop reason: SDUPTHER

## 2019-04-19 ENCOUNTER — HOSPITAL ENCOUNTER (OUTPATIENT)
Dept: MAMMOGRAPHY | Age: 54
Discharge: HOME | End: 2019-04-19
Attending: INTERNAL MEDICINE
Payer: COMMERCIAL

## 2019-04-19 DIAGNOSIS — Z12.31 SCREENING MAMMOGRAM, ENCOUNTER FOR: ICD-10-CM

## 2019-04-19 PROCEDURE — 77063 BREAST TOMOSYNTHESIS BI: CPT

## 2019-05-10 ENCOUNTER — OFFICE VISIT (OUTPATIENT)
Dept: ORTHOPEDIC SURGERY | Facility: CLINIC | Age: 54
End: 2019-05-10

## 2019-05-10 VITALS
BODY MASS INDEX: 26.15 KG/M2 | SYSTOLIC BLOOD PRESSURE: 142 MMHG | WEIGHT: 153.2 LBS | RESPIRATION RATE: 18 BRPM | HEIGHT: 64 IN | TEMPERATURE: 97.4 F | OXYGEN SATURATION: 98 % | DIASTOLIC BLOOD PRESSURE: 77 MMHG | HEART RATE: 81 BPM

## 2019-05-10 DIAGNOSIS — M54.2 NECK PAIN: ICD-10-CM

## 2019-05-10 DIAGNOSIS — M25.511 RIGHT SHOULDER PAIN, UNSPECIFIED CHRONICITY: Primary | ICD-10-CM

## 2019-05-10 NOTE — PROGRESS NOTES
Winnifred Blunt Pipkin 1965 Chief Complaint Patient presents with  Shoulder Pain Right HISTORY OF PRESENT ILLNESS Madelyn Cordova is a 47 y.o. female who presents today for reevaluation of right shoulder pain. She reports doing the home exercise plan given at last visit. She has have a lot of improvement in shoulder pain since then. She has not done the exercise program as regularly as she should but does see improvement when what she has done. She is also requesting some exercises for her neck as well. She has some muscle soreness and pain. Denies any numbness or tingling. Patient denies any fever, chills, chest pain, shortness of breath or calf pain. There are no new illness or injuries to report since last seen in the office. No changes in medications, allergies, social or family history. PHYSICAL EXAM:  
Visit Vitals /77 Pulse 81 Temp 97.4 °F (36.3 °C) (Oral) Resp 18 Ht 5' 4\" (1.626 m) Wt 153 lb 3.2 oz (69.5 kg) SpO2 98% BMI 26.30 kg/m² The patient is a well-developed, well-nourished female   in no acute distress. The patient is alert and oriented times three. Mood and affect are normal. 
LYMPHATIC: lymph nodes are not enlarged and are within normal limits SKIN: normal in color and non tender to palpation. There are no bruises or abrasions noted. NEUROLOGICAL: Motor sensory exam is within normal limits. Reflexes are equal bilaterally. There is normal sensation to pinprick and light touch MUSCULOSKELETAL: 
Examination Right shoulder Skin Intact AC joint tenderness - Biceps tenderness - Forward flexion/Elevation  Active abduction  Glenohumeral abduction 90 External rotation ROM 90 Internal rotation ROM 70 Apprehension -  
Munas Relocation -  
Jerk - Load and Shift -  
[place] - Speeds - Impingement sign - Supraspinatus/Empty Can -, 5/5 External Rotation Strength -, 5/5 Lift Off/Belly Press -, 5/5  
 Neurovascular Intact Examination Neck Skin Intact Tenderness, Paracervical + Paracervical spasms  - Flexion Normal  
Extension Normal  
Lateral bend left Normal  
Lateral bend right Normal  
Masses - Spurling sign - Biceps reflex Normal  
Triceps reflex Normal  
Brachioradialis reflex Normal  
Sensation Normal  
 
 
IMPRESSION:   
  ICD-10-CM ICD-9-CM 1. Right shoulder pain, unspecified chronicity M25.511 719.41   
2. Neck pain M54.2 723.1 PLAN:  
Pt with right shoulder and neck pain Her shoulder pain has improved greatly with the home exercise program. 
She will continue this as needed. Pt given some additional exercises for her neck to help with pain. RTC PRN Shirley Cooper, CHRISTOPHE [phone]

## 2019-06-07 RX ORDER — CYCLOBENZAPRINE HCL 5 MG
TABLET ORAL
Qty: 30 TAB | Refills: 0 | Status: SHIPPED | OUTPATIENT
Start: 2019-06-07 | End: 2019-09-04 | Stop reason: SDUPTHER

## 2019-07-03 RX ORDER — HYDROCHLOROTHIAZIDE 25 MG/1
TABLET ORAL
Qty: 90 TAB | Refills: 2 | Status: SHIPPED | OUTPATIENT
Start: 2019-07-03 | End: 2020-03-13

## 2019-07-24 ENCOUNTER — OFFICE VISIT (OUTPATIENT)
Dept: INTERNAL MEDICINE CLINIC | Age: 54
End: 2019-07-24

## 2019-07-24 VITALS
RESPIRATION RATE: 12 BRPM | BODY MASS INDEX: 26.26 KG/M2 | WEIGHT: 153.8 LBS | OXYGEN SATURATION: 98 % | HEART RATE: 72 BPM | HEIGHT: 64 IN | SYSTOLIC BLOOD PRESSURE: 138 MMHG | TEMPERATURE: 98.2 F | DIASTOLIC BLOOD PRESSURE: 82 MMHG

## 2019-07-24 DIAGNOSIS — J01.00 ACUTE NON-RECURRENT MAXILLARY SINUSITIS: Primary | ICD-10-CM

## 2019-07-24 RX ORDER — AZITHROMYCIN 250 MG/1
TABLET, FILM COATED ORAL
Qty: 6 TAB | Refills: 0 | Status: SHIPPED | OUTPATIENT
Start: 2019-07-24 | End: 2019-07-29

## 2019-07-24 NOTE — PROGRESS NOTES
Lucius Rodriguez 1965, is a 49 y.o. female, who is seen today for evaluation of upper respiratory symptoms. About a week ago she started with congestion and postnasal drip and more recently her throat gotten very sore but she has not had any chills sweats or fever. She thinks her uvula is a bit swollen from the way it feels. She has had pressure in her ears the last couple of days but no bad pain. She tried some Benadryl to dry up secretions but has not used any decongestants. Coughing. Past Medical History:   Diagnosis Date    HSV infection     Type 2    Hypertension     IBS (irritable bowel syndrome)      Current Outpatient Medications   Medication Sig Dispense Refill    hydroCHLOROthiazide (HYDRODIURIL) 25 mg tablet TAKE 1 TABLET DAILY 90 Tab 2    cyclobenzaprine (FLEXERIL) 5 mg tablet TAKE 1 TABLET 3 TIMES A DAYAS NEEDED FOR MUSCLE       SPASM(S). 30 Tab 0    cyclobenzaprine (FLEXERIL) 5 mg tablet TAKE 1 TABLET 3 TIMES A DAYAS NEEDED FOR MUSCLE       SPASM(S). 30 Tab 0    meloxicam (MOBIC) 15 mg tablet Take 1 Tab by mouth daily. 90 Tab 3    cholecalciferol, vitamin D3, (VITAMIN D3) 2,000 unit tab 1 tablet by mouth daily 100 Tab prn    valacyclovir (VALTREX) 1 gram tablet Take 1 tablet by mouth two (2) times a day. 6 tablet 0     Visit Vitals  /82 (BP 1 Location: Left arm, BP Patient Position: Sitting)   Pulse 72   Temp 98.2 °F (36.8 °C) (Oral)   Resp 12   Ht 5' 4\" (1.626 m)   Wt 153 lb 12.8 oz (69.8 kg)   SpO2 98%   BMI 26.40 kg/m²     Ear canals and tympanic membranes appear normal with good light reflex bilaterally, no redness. Neck reveals no adenopathy or tenderness in the anterior and posterior cervical chains. Nasal passages reveal raw appearing mucosa especially on the left side. Pharynx reveals redness without swelling of the uvula or any other tissues and there is no tonsillar enlargement or exudate.     Assessment: Acute sinusitis with eustachian tube dysfunction. Pharynx is sore but does not look like strep. Will treat with azithromycin, have explained that this will continue to work for several days after she finishes 5 days of medicine. Call me if not improving. I do not think she needs a decongestant and she may use Benadryl as needed. I recommended she cut back on the amount of Flonase she is using and be sure to spray in her nostril away from the septum to decrease the risk of bleeding. Follow-up as previously planned September for her annual physical.    Ann Lincoln. Yamileth Moya MD FACP    Please note: This document has been produced using voice recognition software. Unrecognized errors in transcription may be present.

## 2019-09-04 RX ORDER — CYCLOBENZAPRINE HCL 5 MG
5 TABLET ORAL
Qty: 30 TAB | Refills: 0 | Status: SHIPPED | OUTPATIENT
Start: 2019-09-04 | End: 2019-10-21 | Stop reason: SDUPTHER

## 2019-09-04 NOTE — TELEPHONE ENCOUNTER
Last Visit: 7/24/19 with MD Geo Bryan  Next Appointment: 9/16/19 with MD eGo Bryan  Previous Refill Encounter(s): 6/7/19 #30    Requested Prescriptions     Pending Prescriptions Disp Refills    cyclobenzaprine (FLEXERIL) 5 mg tablet 30 Tab 0     Sig: Take 1 Tab by mouth three (3) times daily as needed for Muscle Spasm(s).

## 2019-09-09 ENCOUNTER — HOSPITAL ENCOUNTER (OUTPATIENT)
Dept: LAB | Age: 54
Discharge: HOME | End: 2019-09-09

## 2019-09-09 LAB — XX-LABCORP SPECIMEN COL,LCBCF: NORMAL

## 2019-09-09 PROCEDURE — 99001 SPECIMEN HANDLING PT-LAB: CPT

## 2019-09-10 LAB
ALBUMIN SERPL-MCNC: 4.2 G/DL (ref 3.5–5.5)
ALBUMIN/GLOB SERPL: 2 {RATIO} (ref 1.2–2.2)
ALP SERPL-CCNC: 65 IU/L (ref 39–117)
ALT SERPL-CCNC: 10 IU/L (ref 0–32)
AST SERPL-CCNC: 14 IU/L (ref 0–40)
BASOPHILS # BLD AUTO: 0 X10E3/UL (ref 0–0.2)
BASOPHILS NFR BLD AUTO: 1 %
BILIRUB SERPL-MCNC: 0.3 MG/DL (ref 0–1.2)
BUN SERPL-MCNC: 12 MG/DL (ref 6–24)
BUN/CREAT SERPL: 13 (ref 9–23)
CALCIUM SERPL-MCNC: 9.9 MG/DL (ref 8.7–10.2)
CHLORIDE SERPL-SCNC: 103 MMOL/L (ref 96–106)
CHOLEST SERPL-MCNC: 147 MG/DL (ref 100–199)
CO2 SERPL-SCNC: 26 MMOL/L (ref 20–29)
CREAT SERPL-MCNC: 0.89 MG/DL (ref 0.57–1)
EOSINOPHIL # BLD AUTO: 0.2 X10E3/UL (ref 0–0.4)
EOSINOPHIL NFR BLD AUTO: 3 %
ERYTHROCYTE [DISTWIDTH] IN BLOOD BY AUTOMATED COUNT: 13.1 % (ref 12.3–15.4)
GLOBULIN SER CALC-MCNC: 2.1 G/DL (ref 1.5–4.5)
GLUCOSE SERPL-MCNC: 92 MG/DL (ref 65–99)
HCT VFR BLD AUTO: 38.7 % (ref 34–46.6)
HDLC SERPL-MCNC: 61 MG/DL
HGB BLD-MCNC: 12.6 G/DL (ref 11.1–15.9)
IMM GRANULOCYTES # BLD AUTO: 0 X10E3/UL (ref 0–0.1)
IMM GRANULOCYTES NFR BLD AUTO: 0 %
INTERPRETATION, 910389: NORMAL
LDLC SERPL CALC-MCNC: 65 MG/DL (ref 0–99)
LYMPHOCYTES # BLD AUTO: 1.8 X10E3/UL (ref 0.7–3.1)
LYMPHOCYTES NFR BLD AUTO: 32 %
MCH RBC QN AUTO: 30.4 PG (ref 26.6–33)
MCHC RBC AUTO-ENTMCNC: 32.6 G/DL (ref 31.5–35.7)
MCV RBC AUTO: 94 FL (ref 79–97)
MONOCYTES # BLD AUTO: 0.5 X10E3/UL (ref 0.1–0.9)
MONOCYTES NFR BLD AUTO: 8 %
NEUTROPHILS # BLD AUTO: 3.1 X10E3/UL (ref 1.4–7)
NEUTROPHILS NFR BLD AUTO: 56 %
PLATELET # BLD AUTO: 283 X10E3/UL (ref 150–450)
POTASSIUM SERPL-SCNC: 4.2 MMOL/L (ref 3.5–5.2)
PROT SERPL-MCNC: 6.3 G/DL (ref 6–8.5)
RBC # BLD AUTO: 4.14 X10E6/UL (ref 3.77–5.28)
SODIUM SERPL-SCNC: 144 MMOL/L (ref 134–144)
SPECIMEN STATUS REPORT, ROLRST: NORMAL
TRIGL SERPL-MCNC: 107 MG/DL (ref 0–149)
VLDLC SERPL CALC-MCNC: 21 MG/DL (ref 5–40)
WBC # BLD AUTO: 5.6 X10E3/UL (ref 3.4–10.8)

## 2019-09-16 ENCOUNTER — HOSPITAL ENCOUNTER (OUTPATIENT)
Dept: LAB | Age: 54
Discharge: HOME | End: 2019-09-16
Payer: COMMERCIAL

## 2019-09-16 ENCOUNTER — OFFICE VISIT (OUTPATIENT)
Dept: INTERNAL MEDICINE CLINIC | Age: 54
End: 2019-09-16

## 2019-09-16 VITALS
DIASTOLIC BLOOD PRESSURE: 80 MMHG | RESPIRATION RATE: 12 BRPM | TEMPERATURE: 98.5 F | HEIGHT: 64 IN | HEART RATE: 74 BPM | SYSTOLIC BLOOD PRESSURE: 136 MMHG | BODY MASS INDEX: 26.26 KG/M2 | WEIGHT: 153.8 LBS | OXYGEN SATURATION: 99 %

## 2019-09-16 DIAGNOSIS — Z00.00 ROUTINE GENERAL MEDICAL EXAMINATION AT A HEALTH CARE FACILITY: ICD-10-CM

## 2019-09-16 DIAGNOSIS — Z01.419 WELL WOMAN EXAM WITH ROUTINE GYNECOLOGICAL EXAM: Primary | ICD-10-CM

## 2019-09-16 DIAGNOSIS — I10 PRIMARY HYPERTENSION: ICD-10-CM

## 2019-09-16 PROCEDURE — 87624 HPV HI-RISK TYP POOLED RSLT: CPT

## 2019-09-16 PROCEDURE — 88142 CYTOPATH C/V THIN LAYER: CPT

## 2019-09-16 NOTE — PROGRESS NOTES
Rui Luke 1965, is a 49 y.o. female, who is seen today for a routine physical exam and follow-up on IBS DJD hypertension. She feels well. She takes her medicine regularly. No chest pain or dyspnea. No pain in the legs or feet. She quit smoking over 2 years ago. She has not gone back to smoking. Past Medical History:   Diagnosis Date    HSV infection     Type 2    Hypertension     IBS (irritable bowel syndrome)      Past Surgical History:   Procedure Laterality Date    EXCISE BREAST LES W XRAY MARKER Right 1-22-16    Dr. Michael Andre Left 1/2/2015    LEFT NIPPLE DUCT EXPLRATION WITH EXCISIONAL BIOPSY performed by Owen Lancaster MD at [address] [place] [address] BREAST BIOPSY Right 1/22/2016    WIDE LOCAL EXCISION WITH NEEDLE LOCALIZATION MAMMOGRAM RIGHT BREAST LESION performed by Owen Lancaster MD at [address] [place] [address] COLONOSCOPY  10/03/2016    Normal tissue, 10 years     Current Outpatient Medications   Medication Sig Dispense Refill    cyclobenzaprine (FLEXERIL) 5 mg tablet Take 1 Tab by mouth three (3) times daily as needed for Muscle Spasm(s). 30 Tab 0    hydroCHLOROthiazide (HYDRODIURIL) 25 mg tablet TAKE 1 TABLET DAILY 90 Tab 2    cyclobenzaprine (FLEXERIL) 5 mg tablet TAKE 1 TABLET 3 TIMES A DAYAS NEEDED FOR MUSCLE       SPASM(S). 30 Tab 0    meloxicam (MOBIC) 15 mg tablet Take 1 Tab by mouth daily. 90 Tab 3    cholecalciferol, vitamin D3, (VITAMIN D3) 2,000 unit tab 1 tablet by mouth daily 100 Tab prn    valacyclovir (VALTREX) 1 gram tablet Take 1 tablet by mouth two (2) times a day. 6 tablet 0     Allergies   Allergen Reactions    Clindamycin Other (comments)     Bowel infection.     Pcn [Penicillins] Unknown (comments)    Tramadol Nausea Only     Social History     Socioeconomic History    Marital status: SINGLE     Spouse name: Not on file    Number of children: Not on file    Years of education: Not on file    Highest education level: Not on file Tobacco Use    Smoking status: Former Smoker     Packs/day: 1.00     Years: 25.00     Pack years: 25.00     Types: Cigarettes     Last attempt to quit: 1/26/2016     Years since quitting: 3.6    Smokeless tobacco: Never Used   Substance and Sexual Activity    Alcohol use: Yes     Comment: rare    Drug use: No    Sexual activity: Yes     Partners: Male     Birth control/protection: Pill     Visit Vitals  /80   Pulse 74   Temp 98.5 °F (36.9 °C) (Oral)   Resp 12   Ht 5' 4\" (1.626 m)   Wt 153 lb 12.8 oz (69.8 kg)   SpO2 99%   BMI 26.40 kg/m²     HEENT: The pupils are equal and react to light. Extraocular movements are full. Ear canals appear normal and tympanic membranes appear normal. Oral cavity is unremarkable with no oral lesions. Neck: Carotids are 2+ without bruits. No adenopathy or thyromegaly. Lungs are clear to percussion, and I hear no wheezing, rales or rhonchi. Heart reveals the apical impulse to be in the fifth interspace at the midclavicular line. No murmur, gallop, click or rub. Abdomen is soft and nontender with no hepatosplenomegaly or masses. Bowels are normoactive, no tympany, distention, and no bruits. Extremities reveal no clubbing cyanosis or edema. Pulses are 2+ except absent dorsalis pedis pulses. Skin reveals no suspicious growths. Breasts revealed no masses, skin or nipple abnormalities. No axillary adenopathy. Pelvic exam reveals normal appearing external genitalia, normal-appearing urethra and urethral meatus, normal appearing vaginal mucosa and cervix and no adnexal masses or tenderness. Rectal exam is normal. Pap smear was obtained from the uterine cervix including endocervical brushing. Recent lab is all normal including hemoglobin 12.6 MCV 94 fasting glucose 92 cholesterol 147 HDL 61 LDL 65. Assessment: #1. Hypertension adequately controlled, she will continue hydrochlorothiazide 25 mg daily. #2.  IBS doing well currently.   #3.  History of cervical radiculopathy doing much better for a while now. #4.  DJD, she will continue meloxicam as needed. #5.  I cannot palpate dorsalis pedis pulses today but I will recheck in 6 months. We discussed this but she no longer smokes and has normal cholesterol and blood pressure. Follow-up 6 months. Izaiah Key MD FACP    Please note: This document has been produced using voice recognition software. Unrecognized errors in transcription may be present.

## 2019-09-19 DIAGNOSIS — I10 ESSENTIAL HYPERTENSION WITH GOAL BLOOD PRESSURE LESS THAN 140/90: ICD-10-CM

## 2019-10-22 RX ORDER — CYCLOBENZAPRINE HCL 5 MG
TABLET ORAL
Qty: 30 TAB | Refills: 0 | Status: SHIPPED | OUTPATIENT
Start: 2019-10-22 | End: 2020-03-13 | Stop reason: SDUPTHER

## 2019-10-23 ENCOUNTER — PATIENT MESSAGE (OUTPATIENT)
Dept: INTERNAL MEDICINE CLINIC | Age: 54
End: 2019-10-23

## 2019-10-23 DIAGNOSIS — R63.5 WEIGHT GAIN: Primary | ICD-10-CM

## 2019-10-23 RX ORDER — PHENTERMINE HYDROCHLORIDE 37.5 MG/1
37.5 TABLET ORAL
Qty: 30 TAB | Refills: 5 | OUTPATIENT
Start: 2019-10-23 | End: 2021-01-18

## 2019-11-06 ENCOUNTER — CLINICAL SUPPORT (OUTPATIENT)
Dept: INTERNAL MEDICINE CLINIC | Age: 54
End: 2019-11-06

## 2019-11-06 DIAGNOSIS — Z23 ENCOUNTER FOR IMMUNIZATION: ICD-10-CM

## 2019-11-06 NOTE — PATIENT INSTRUCTIONS
Vaccine Information Statement    Influenza (Flu) Vaccine (Inactivated or Recombinant): What You Need to Know    Many Vaccine Information Statements are available in [place]ian and other languages. See www.immunize.org/vis  Hojas de información sobre vacunas están disponibles en español y en muchos otros idiomas. Visite www.immunize.org/vis    1. Why get vaccinated? Influenza vaccine can prevent influenza (flu). Flu is a contagious disease that spreads around the [facility] every year, usually between October and May. Anyone can get the flu, but it is more dangerous for some people. Infants and young children, people 72years of age and older, pregnant women, and people with certain health conditions or a weakened immune system are at greatest risk of flu complications. Pneumonia, bronchitis, sinus infections and ear infections are examples of flu-related complications. If you have a medical condition, such as heart disease, cancer or diabetes, flu can make it worse. Flu can cause fever and chills, sore throat, muscle aches, fatigue, cough, headache, and runny or stuffy nose. Some people may have vomiting and diarrhea, though this is more common in children than adults. Each year thousands of people in the [facility] die from flu, and many more are hospitalized. Flu vaccine prevents millions of illnesses and flu-related visits to the doctor each year. 2. Influenza vaccines     CDC recommends everyone 10months of age and older get vaccinated every flu season. Children 6 months through 6years of age may need 2 doses during a single flu season. Everyone else needs only 1 dose each flu season. It takes about 2 weeks for protection to develop after vaccination. There are many flu viruses, and they are always changing. Each year a new flu vaccine is made to protect against three or four viruses that are likely to cause disease in the upcoming flu season.  Even when the vaccine doesnt exactly match these viruses, it may still provide some protection. Influenza vaccine does not cause flu. Influenza vaccine may be given at the same time as other vaccines. 3. Talk with your health care provider    Tell your vaccine provider if the person getting the vaccine:   Has had an allergic reaction after a previous dose of influenza vaccine, or has any severe, life-threatening allergies.  Has ever had Guillain-Barré Syndrome (also called GBS). In some cases, your health care provider may decide to postpone influenza vaccination to a future visit. People with minor illnesses, such as a cold, may be vaccinated. People who are moderately or severely ill should usually wait until they recover before getting influenza vaccine. Your health care provider can give you more information. 4. Risks of a reaction     Soreness, redness, and swelling where shot is given, fever, muscle aches, and headache can happen after influenza vaccine.  There may be a very small increased risk of Guillain-Barré Syndrome (GBS) after inactivated influenza vaccine (the flu shot). Irl Pae children who get the flu shot along with pneumococcal vaccine (PCV13), and/or DTaP vaccine at the same time might be slightly more likely to have a seizure caused by fever. Tell your health care provider if a child who is getting flu vaccine has ever had a seizure. People sometimes faint after medical procedures, including vaccination. Tell your provider if you feel dizzy or have vision changes or ringing in the ears. As with any medicine, there is a very remote chance of a vaccine causing a severe allergic reaction, other serious injury, or death. 5. What if there is a serious problem? An allergic reaction could occur after the vaccinated person leaves the clinic.  If you see signs of a severe allergic reaction (hives, swelling of the face and throat, difficulty breathing, a fast heartbeat, dizziness, or weakness), call 9-1-1 and get the person to the nearest hospital.    For other signs that concern you, call your health care provider. Adverse reactions should be reported to the Vaccine Adverse Event Reporting System (VAERS). Your health care provider will usually file this report, or you can do it yourself. Visit the VAERS website at www.vaers. [facility].gov or call 0-636.516.8050. VAERS is only for reporting reactions, and VAERS staff do not give medical advice. 6. The National Vaccine Injury Compensation Program    The [facility] Vaccine Injury Compensation Program (VICP) is a federal program that was created to compensate people who may have been injured by certain vaccines. Visit the VICP website at www.[facility].gov/vaccinecompensation or call 0-335.209.4124 to learn about the program and about filing a claim. There is a time limit to file a claim for compensation. 7. How can I learn more?  Ask your health care provider.  Call your local or state health department.  Contact the Centers for Disease Control and Prevention (CDC):  - Call 4-202.954.1811 (1-800-CDC-INFO) or  - Visit CDCs influenza website at www.cdc.gov/flu    Vaccine Information Statement (Interim)  Inactivated Influenza Vaccine   8/15/2019  42 FAN [place] [id number]   Department of Health and Human Services  Centers for Disease Control and Prevention    Office Use Only

## 2019-11-18 RX ORDER — CYCLOBENZAPRINE HCL 5 MG
TABLET ORAL
Qty: 30 TAB | Refills: 0 | Status: SHIPPED | OUTPATIENT
Start: 2019-11-18 | End: 2019-12-24

## 2019-12-24 RX ORDER — CYCLOBENZAPRINE HCL 5 MG
TABLET ORAL
Qty: 30 TAB | Refills: 0 | Status: SHIPPED | OUTPATIENT
Start: 2019-12-24 | End: 2020-01-15

## 2020-01-15 RX ORDER — CYCLOBENZAPRINE HCL 5 MG
TABLET ORAL
Qty: 30 TAB | Refills: 0 | Status: SHIPPED | OUTPATIENT
Start: 2020-01-15 | End: 2020-02-24

## 2020-02-24 RX ORDER — CYCLOBENZAPRINE HCL 5 MG
TABLET ORAL
Qty: 30 TAB | Refills: 0 | Status: SHIPPED | OUTPATIENT
Start: 2020-02-24 | End: 2020-03-13

## 2020-03-13 RX ORDER — HYDROCHLOROTHIAZIDE 25 MG/1
TABLET ORAL
Qty: 90 TAB | Refills: 2 | Status: SHIPPED | OUTPATIENT
Start: 2020-03-13 | End: 2020-12-01

## 2020-03-13 RX ORDER — CYCLOBENZAPRINE HCL 5 MG
TABLET ORAL
Qty: 30 TAB | Refills: 0 | Status: SHIPPED | OUTPATIENT
Start: 2020-03-13 | End: 2020-04-19

## 2020-04-19 RX ORDER — CYCLOBENZAPRINE HCL 5 MG
TABLET ORAL
Qty: 30 TAB | Refills: 2 | Status: SHIPPED | OUTPATIENT
Start: 2020-04-19 | End: 2020-07-07 | Stop reason: SDUPTHER

## 2020-07-07 ENCOUNTER — OFFICE VISIT (OUTPATIENT)
Dept: INTERNAL MEDICINE CLINIC | Age: 55
End: 2020-07-07

## 2020-07-07 ENCOUNTER — TELEPHONE (OUTPATIENT)
Dept: INTERNAL MEDICINE CLINIC | Age: 55
End: 2020-07-07

## 2020-07-07 VITALS
HEIGHT: 64 IN | TEMPERATURE: 98.4 F | OXYGEN SATURATION: 98 % | DIASTOLIC BLOOD PRESSURE: 82 MMHG | SYSTOLIC BLOOD PRESSURE: 130 MMHG | RESPIRATION RATE: 12 BRPM | HEART RATE: 83 BPM | WEIGHT: 151.4 LBS | BODY MASS INDEX: 25.85 KG/M2

## 2020-07-07 DIAGNOSIS — J30.1 NON-SEASONAL ALLERGIC RHINITIS DUE TO POLLEN: ICD-10-CM

## 2020-07-07 DIAGNOSIS — J30.1 SEASONAL ALLERGIC RHINITIS DUE TO POLLEN: ICD-10-CM

## 2020-07-07 DIAGNOSIS — Z12.31 SCREENING MAMMOGRAM, ENCOUNTER FOR: ICD-10-CM

## 2020-07-07 DIAGNOSIS — R63.5 WEIGHT GAIN: ICD-10-CM

## 2020-07-07 DIAGNOSIS — M54.12 CERVICAL RADICULOPATHY: ICD-10-CM

## 2020-07-07 DIAGNOSIS — I10 PRIMARY HYPERTENSION: Primary | ICD-10-CM

## 2020-07-07 RX ORDER — PHENTERMINE HYDROCHLORIDE 37.5 MG/1
37.5 TABLET ORAL
Qty: 30 TAB | Refills: 5 | Status: CANCELLED | OUTPATIENT
Start: 2020-07-07

## 2020-07-07 RX ORDER — CYCLOBENZAPRINE HCL 5 MG
5 TABLET ORAL
Qty: 30 TAB | Refills: 5 | Status: SHIPPED | OUTPATIENT
Start: 2020-07-07 | End: 2020-12-30 | Stop reason: SDUPTHER

## 2020-07-07 RX ORDER — MELOXICAM 15 MG/1
15 TABLET ORAL DAILY
Qty: 90 TAB | Refills: 3 | Status: SHIPPED | OUTPATIENT
Start: 2020-07-07 | End: 2021-01-18 | Stop reason: SDUPTHER

## 2020-07-07 NOTE — PROGRESS NOTES
Lios Carlos 1965, is a 48 y.o. female, who is seen today for reevaluation of hypertension, recurrent cervical radiculopathy, mild overweight, and family history of breast cancer. She canceled her appointment during the peak of the pandemic and rescheduled. She feels well but she is due for mammography, last mammogram was about 14 months ago and her mother has a history of breast cancer, postmenopausal just a few years ago. Patient asked about the possibility of using phentermine, she had gained weight last year and now over the last year has lost weight again and her weight is just 2 pounds different from what it was a year ago with body mass index of 26. She is eating a very healthy diet. When she does have neck pain she tries to avoid steroids and instead uses meloxicam and sometimes Flexeril. She has mild hypertension and uses hydrochlorothiazide every day. She has a history of recurrent HSV and occasionally uses valacyclovir 1 g twice a day for 3 days in a row. Past Medical History:   Diagnosis Date    HSV infection     Type 2    Hypertension     IBS (irritable bowel syndrome)      Current Outpatient Medications   Medication Sig Dispense Refill    meloxicam (MOBIC) 15 mg tablet Take 1 Tab by mouth daily. 90 Tab 3    cyclobenzaprine (FLEXERIL) 5 mg tablet Take 1 Tab by mouth nightly. 30 Tab 5    hydroCHLOROthiazide (HYDRODIURIL) 25 mg tablet TAKE 1 TABLET DAILY 90 Tab 2    phentermine (ADIPEX-P) 37.5 mg tablet Take 1 Tab by mouth every morning. Max Daily Amount: 37.5 mg. 30 Tab 5    cholecalciferol, vitamin D3, (VITAMIN D3) 2,000 unit tab 1 tablet by mouth daily 100 Tab prn    valacyclovir (VALTREX) 1 gram tablet Take 1 tablet by mouth two (2) times a day.  6 tablet 0     Visit Vitals  /82 (BP 1 Location: Left arm, BP Patient Position: Sitting)   Pulse 83   Temp 98.4 °F (36.9 °C) (Oral)   Resp 12   Ht 5' 4\" (1.626 m)   Wt 151 lb 6.4 oz (68.7 kg)   SpO2 98%   BMI 25.99 kg/m²     Neck is very supple with no discomfort with range of motion. Carotids are 2+ without bruits. Lungs are clear to percussion. Good breath sounds with no wheezing or crackles. Heart reveals a regular rhythm with normal S1 and S2 no murmur gallop click or rub. Apical impulse is not palpable. Abdomen is soft and nontender with no hepatosplenomegaly or masses and no bruits. Extremities reveal no clubbing cyanosis or edema. Pulses are 2+. Assessment: #1. Hypertension well controlled, she will continue hydrochlorothiazide 25 mg daily. #2.  Minimal overweight with body mass index of 26 and stable over the last year, I recommended very healthy diet, not going overboard on sweets or white starches and eating adequate vegetables and salads I recommended she not get a prescription for phentermine based on current weight and health. #3.  Recurrent cervical radiculopathy doing well for quite a while now, she will use meloxicam and Flexeril when needed. Because of side effects she will try to avoid steroids unless the pain becomes quite intense. #4. She is due for mammography so I will order that for her, she should be getting yearly mammograms with her mother having had breast cancer in the past.    Follow-up in about 6 months with Dr. German Chung. Her Pap smear in September was normal and human papilloma virus test was negative so she should not need another Pap smear until September, 2024. I gave her a copy of her Pap smear and wrote that on it, this is in keeping with current consensus national guidelines. Izaiah Magaña MD FACP    Please note: This document has been produced using voice recognition software. Unrecognized errors in transcription may be present.

## 2020-07-07 NOTE — TELEPHONE ENCOUNTER
At check out pt said she forgot to give form to DR Carlos Hernandez to fill out , she asked if it could be faxed to   [phone]

## 2020-07-09 ENCOUNTER — TELEPHONE (OUTPATIENT)
Dept: INTERNAL MEDICINE CLINIC | Age: 55
End: 2020-07-09

## 2020-07-09 NOTE — TELEPHONE ENCOUNTER
Left message for pt that RM was not able to fill out the form patient dropped off. Form says the labs would have had to been drawn between 5/1/2020 and 4/30/2021 to be eligible for incentive. Form is at the  if she wants to  or we can mail it to her.

## 2020-07-10 DIAGNOSIS — Z00.00 ROUTINE GENERAL MEDICAL EXAMINATION AT A HEALTH CARE FACILITY: Primary | ICD-10-CM

## 2020-09-01 ENCOUNTER — HOSPITAL ENCOUNTER (OUTPATIENT)
Dept: MAMMOGRAPHY | Age: 55
Discharge: HOME | End: 2020-09-01
Attending: INTERNAL MEDICINE
Payer: COMMERCIAL

## 2020-09-01 DIAGNOSIS — Z12.31 SCREENING MAMMOGRAM, ENCOUNTER FOR: ICD-10-CM

## 2020-09-01 PROCEDURE — 77063 BREAST TOMOSYNTHESIS BI: CPT

## 2020-10-07 ENCOUNTER — HOSPITAL ENCOUNTER (OUTPATIENT)
Dept: LAB | Age: 55
Discharge: HOME | End: 2020-10-07
Payer: COMMERCIAL

## 2020-10-07 DIAGNOSIS — Z00.00 ROUTINE GENERAL MEDICAL EXAMINATION AT A HEALTH CARE FACILITY: ICD-10-CM

## 2020-10-07 LAB
ALBUMIN SERPL-MCNC: 3.9 G/DL (ref 3.4–5)
ALBUMIN/GLOB SERPL: 1.3 {RATIO} (ref 0.8–1.7)
ALP SERPL-CCNC: 72 U/L (ref 45–117)
ALT SERPL-CCNC: 25 U/L (ref 13–56)
ANION GAP SERPL CALC-SCNC: 4 MMOL/L (ref 3–18)
AST SERPL-CCNC: 18 U/L (ref 10–38)
BASOPHILS # BLD: 0 K/UL (ref 0–0.1)
BASOPHILS NFR BLD: 0 % (ref 0–2)
BILIRUB SERPL-MCNC: 0.3 MG/DL (ref 0.2–1)
BUN SERPL-MCNC: 10 MG/DL (ref 7–18)
BUN/CREAT SERPL: 13 (ref 12–20)
CALCIUM SERPL-MCNC: 9.7 MG/DL (ref 8.5–10.1)
CHLORIDE SERPL-SCNC: 108 MMOL/L (ref 100–111)
CHOLEST SERPL-MCNC: 149 MG/DL
CO2 SERPL-SCNC: 33 MMOL/L (ref 21–32)
CREAT SERPL-MCNC: 0.8 MG/DL (ref 0.6–1.3)
DIFFERENTIAL METHOD BLD: NORMAL
EOSINOPHIL # BLD: 0.2 K/UL (ref 0–0.4)
EOSINOPHIL NFR BLD: 3 % (ref 0–5)
ERYTHROCYTE [DISTWIDTH] IN BLOOD BY AUTOMATED COUNT: 13.4 % (ref 11.6–14.5)
EST. AVERAGE GLUCOSE BLD GHB EST-MCNC: 111 MG/DL
GLOBULIN SER CALC-MCNC: 3 G/DL (ref 2–4)
GLUCOSE SERPL-MCNC: 87 MG/DL (ref 74–99)
HBA1C MFR BLD: 5.5 % (ref 4.2–5.6)
HCT VFR BLD AUTO: 41.6 % (ref 35–45)
HDLC SERPL-MCNC: 61 MG/DL (ref 40–60)
HDLC SERPL: 2.4 {RATIO} (ref 0–5)
HGB BLD-MCNC: 13.4 G/DL (ref 12–16)
LDLC SERPL CALC-MCNC: 61 MG/DL (ref 0–100)
LIPID PROFILE,FLP: ABNORMAL
LYMPHOCYTES # BLD: 1.8 K/UL (ref 0.9–3.6)
LYMPHOCYTES NFR BLD: 31 % (ref 21–52)
MCH RBC QN AUTO: 29.9 PG (ref 24–34)
MCHC RBC AUTO-ENTMCNC: 32.2 G/DL (ref 31–37)
MCV RBC AUTO: 92.9 FL (ref 74–97)
MONOCYTES # BLD: 0.6 K/UL (ref 0.05–1.2)
MONOCYTES NFR BLD: 9 % (ref 3–10)
NEUTS SEG # BLD: 3.4 K/UL (ref 1.8–8)
NEUTS SEG NFR BLD: 57 % (ref 40–73)
PLATELET # BLD AUTO: 304 K/UL (ref 135–420)
PMV BLD AUTO: 11.2 FL (ref 9.2–11.8)
POTASSIUM SERPL-SCNC: 4 MMOL/L (ref 3.5–5.5)
PROT SERPL-MCNC: 6.9 G/DL (ref 6.4–8.2)
RBC # BLD AUTO: 4.48 M/UL (ref 4.2–5.3)
SODIUM SERPL-SCNC: 145 MMOL/L (ref 136–145)
TRIGL SERPL-MCNC: 135 MG/DL (ref ?–150)
VLDLC SERPL CALC-MCNC: 27 MG/DL
WBC # BLD AUTO: 5.9 K/UL (ref 4.6–13.2)

## 2020-10-07 PROCEDURE — 85025 COMPLETE CBC W/AUTO DIFF WBC: CPT

## 2020-10-07 PROCEDURE — 80053 COMPREHEN METABOLIC PANEL: CPT

## 2020-10-07 PROCEDURE — 83036 HEMOGLOBIN GLYCOSYLATED A1C: CPT

## 2020-10-07 PROCEDURE — 36415 COLL VENOUS BLD VENIPUNCTURE: CPT

## 2020-10-07 PROCEDURE — 80061 LIPID PANEL: CPT

## 2020-11-04 ENCOUNTER — OFFICE VISIT (OUTPATIENT)
Dept: INTERNAL MEDICINE CLINIC | Age: 55
End: 2020-11-04
Payer: COMMERCIAL

## 2020-11-04 DIAGNOSIS — Z23 NEEDS FLU SHOT: Primary | ICD-10-CM

## 2020-11-04 PROCEDURE — 90471 IMMUNIZATION ADMIN: CPT | Performed by: NURSE PRACTITIONER

## 2020-11-04 PROCEDURE — 90686 IIV4 VACC NO PRSV 0.5 ML IM: CPT | Performed by: NURSE PRACTITIONER

## 2020-12-01 DIAGNOSIS — I10 PRIMARY HYPERTENSION: Primary | ICD-10-CM

## 2020-12-01 RX ORDER — HYDROCHLOROTHIAZIDE 25 MG/1
25 TABLET ORAL DAILY
Qty: 30 TAB | Refills: 0 | Status: SHIPPED | OUTPATIENT
Start: 2020-12-08 | End: 2020-12-01 | Stop reason: SDUPTHER

## 2020-12-01 RX ORDER — HYDROCHLOROTHIAZIDE 25 MG/1
25 TABLET ORAL DAILY
Qty: 30 TAB | Refills: 0 | Status: SHIPPED | OUTPATIENT
Start: 2020-12-08 | End: 2020-12-30

## 2020-12-02 ENCOUNTER — TELEPHONE (OUTPATIENT)
Dept: INTERNAL MEDICINE CLINIC | Age: 55
End: 2020-12-02

## 2020-12-02 NOTE — TELEPHONE ENCOUNTER
----- Message from Juanita Reese DNP sent at 12/1/2020  8:33 AM EST -----  [facility] : I have refilled HCTZ x30 days. She should have enough HCTZ medication to hold her until 12/13/2020 but she can pick this medication up from the pharmacy on 12/8/2020. I sent this medication to [facility] pharm since it is only 30 day supply. I will escribe to her mail-in pharm at her 1/8/2021 appt.      Please call patient and remind her of her 1/8/2021 new patient appt with me. She will need labs 1 week prior to appt.  Thank you

## 2020-12-02 NOTE — TELEPHONE ENCOUNTER
Pt returned call and I gave her message. She said she had labs done 10/7/20 because RM had put in orders for her since she needed to complete for her insurance requirements at her workplace.

## 2020-12-25 DIAGNOSIS — I10 PRIMARY HYPERTENSION: ICD-10-CM

## 2020-12-30 RX ORDER — HYDROCHLOROTHIAZIDE 25 MG/1
TABLET ORAL
Qty: 30 TAB | Refills: 0 | Status: SHIPPED | OUTPATIENT
Start: 2020-12-30 | End: 2021-01-18 | Stop reason: SDUPTHER

## 2020-12-30 NOTE — TELEPHONE ENCOUNTER
Last visit 07/07/2020 MD Kennedi Reno   Next appointment 01/18/2021 [facility]   Previous refill encounter(s)   07/07/2020 Flexeril #30 with 5 refills. Requested Prescriptions     Pending Prescriptions Disp Refills    cyclobenzaprine (FLEXERIL) 5 mg tablet 30 Tab 0     Sig: Take 1 Tab by mouth nightly.

## 2021-01-04 RX ORDER — CYCLOBENZAPRINE HCL 5 MG
5 TABLET ORAL
Qty: 30 TAB | Refills: 0 | Status: SHIPPED | OUTPATIENT
Start: 2021-01-04 | End: 2021-01-18 | Stop reason: DRUGHIGH

## 2021-01-18 ENCOUNTER — OFFICE VISIT (OUTPATIENT)
Dept: INTERNAL MEDICINE CLINIC | Age: 56
End: 2021-01-18
Payer: COMMERCIAL

## 2021-01-18 VITALS
HEIGHT: 64 IN | OXYGEN SATURATION: 99 % | TEMPERATURE: 97.9 F | SYSTOLIC BLOOD PRESSURE: 136 MMHG | DIASTOLIC BLOOD PRESSURE: 78 MMHG | RESPIRATION RATE: 16 BRPM | BODY MASS INDEX: 25.61 KG/M2 | WEIGHT: 150 LBS | HEART RATE: 79 BPM

## 2021-01-18 DIAGNOSIS — I10 PRIMARY HYPERTENSION: ICD-10-CM

## 2021-01-18 DIAGNOSIS — Z76.89 ENCOUNTER TO ESTABLISH CARE WITH NEW DOCTOR: Primary | ICD-10-CM

## 2021-01-18 DIAGNOSIS — Z79.1 LONG TERM CURRENT USE OF NON-STEROIDAL ANTI-INFLAMMATORIES (NSAID): ICD-10-CM

## 2021-01-18 DIAGNOSIS — M54.12 CERVICAL RADICULOPATHY: ICD-10-CM

## 2021-01-18 PROBLEM — N20.0 NEPHROLITHIASIS: Status: RESOLVED | Noted: 2018-09-11 | Resolved: 2021-01-18

## 2021-01-18 PROBLEM — E66.3 OVERWEIGHT (BMI 25.0-29.9): Status: RESOLVED | Noted: 2018-03-05 | Resolved: 2021-01-18

## 2021-01-18 PROBLEM — J30.1 NON-SEASONAL ALLERGIC RHINITIS DUE TO POLLEN: Status: RESOLVED | Noted: 2017-08-30 | Resolved: 2021-01-18

## 2021-01-18 PROCEDURE — 99215 OFFICE O/P EST HI 40 MIN: CPT | Performed by: NURSE PRACTITIONER

## 2021-01-18 RX ORDER — OMEPRAZOLE 20 MG/1
20 CAPSULE, DELAYED RELEASE ORAL
Qty: 90 CAP | Refills: 1 | Status: SHIPPED | OUTPATIENT
Start: 2021-01-18 | End: 2021-07-19 | Stop reason: SDUPTHER

## 2021-01-18 RX ORDER — HYDROCHLOROTHIAZIDE 25 MG/1
25 TABLET ORAL DAILY
Qty: 90 TAB | Refills: 1 | Status: SHIPPED | OUTPATIENT
Start: 2021-01-18 | End: 2021-07-19 | Stop reason: SDUPTHER

## 2021-01-18 RX ORDER — MELOXICAM 15 MG/1
15 TABLET ORAL DAILY
Qty: 90 TAB | Refills: 1 | Status: SHIPPED | OUTPATIENT
Start: 2021-01-18 | End: 2021-07-19 | Stop reason: SDUPTHER

## 2021-01-18 RX ORDER — CYCLOBENZAPRINE HCL 10 MG
10 TABLET ORAL
Qty: 30 TAB | Refills: 5 | Status: SHIPPED | OUTPATIENT
Start: 2021-01-18 | End: 2021-09-07

## 2021-01-18 NOTE — PROGRESS NOTES
Harry Lau presents today for   Chief Complaint   Patient presents with   [address]    Form Completion     wellness form                 Coordination of Care:  1. Have you been to the ER, urgent care clinic since your last visit? Hospitalized since your last visit? no    2. Have you seen or consulted any other health care providers outside of the [address] [place] [address] since your last visit? Include any pap smears or colon screening.  no

## 2021-01-19 NOTE — PROGRESS NOTES
[facility] [address] [address]   [place], [address]  [phone] [facility]/[phone] fax    1/18/2021    HPI:   Carry Heft Pipkin 1965 is a pleasant WHITE OR  female who presents today to establish care and for routine physical exam. 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 Menopausal sx: taking OTC Estroven which is effective for her symptoms. Chronic neck pain: she has hx of cervical disc bulge and stenosis. She was prescribed flexeril 5mg but this dose is not effective; she takes 2 tabs 2-3x/w depending on her muscle stiffness. She also takes meloxicam daily. She has monthly massages to help with muscle stiffness and discomforts. She experience numbness and tingling in her Rt hand at times; depending on activity. This resolves on its own. HTN: taking med as prescribed and tolerating well. She checks her BP periodically. Past Medical History:   Diagnosis Date    HSV infection     Type 2    Hypertension     IBS (irritable bowel syndrome)     Lateral epicondylitis of right elbow 4/22/2016    Nephrolithiasis 9/11/2018     Past Surgical History:   Procedure Laterality Date    HX BREAST BIOPSY Left 1/2/2015    LEFT NIPPLE DUCT EXPLRATION WITH EXCISIONAL BIOPSY performed by Rj Cardoso MD at [address] [place] [address] HX BREAST BIOPSY Right 1/22/2016    WIDE LOCAL EXCISION WITH NEEDLE LOCALIZATION MAMMOGRAM RIGHT BREAST LESION performed by Rj Cardoso MD at [address] [place] [address] HX COLONOSCOPY  10/03/2016    Normal tissue, 10 years    PA EXCISE BREAST LES W XRAY MARKER Right 1-22-16    Dr. Licha Moya     Current Outpatient Medications   Medication Sig    hydroCHLOROthiazide (HYDRODIURIL) 25 mg tablet Take 1 Tab by mouth daily.  cyclobenzaprine (FLEXERIL) 10 mg tablet Take 1 Tab by mouth three (3) times daily as needed for Muscle Spasm(s) (Muscle spasms).     omeprazole (PRILOSEC) 20 mg capsule Take 1 Cap by mouth daily as needed (reflux).  meloxicam (MOBIC) 15 mg tablet Take 1 Tab by mouth daily.  cholecalciferol, vitamin D3, (VITAMIN D3) 2,000 unit tab 1 tablet by mouth daily    valacyclovir (VALTREX) 1 gram tablet Take 1 tablet by mouth two (2) times a day. No current facility-administered medications for this visit. Allergies and Intolerances: Allergies   Allergen Reactions    Clindamycin Other (comments)     Bowel infection.  Pcn [Penicillins] Unknown (comments)    Tramadol Nausea Only     Family History:   Family History   Problem Relation Age of Onset    Cancer Mother 62        breast    Breast Cancer Mother 79    Arthritis-osteo Mother     Diabetes Mother     Heart Disease Mother     Hypertension Mother     Lung Disease Mother         COPD    Heart Attack Father 48        he was a smoker    Heart Disease Father     Lung Disease Father         COPD    Breast Cancer Maternal Aunt 79    Breast Cancer Maternal Grandmother 64    Cancer Maternal Grandmother     Heart Disease Maternal Grandmother     Hypertension Maternal Grandmother     Cancer Maternal Aunt      Social History:   She  reports that she quit smoking about 22 months ago. Her smoking use included cigarettes. She started smoking about 37 years ago. She has a 35.00 pack-year smoking history. She has never used smokeless tobacco.   Social History     Substance and Sexual Activity   Alcohol Use Yes    Comment: once in a while     Immunization History:  Immunization History   Administered Date(s) Administered    Influenza Vaccine 10/28/2013    Influenza Vaccine (Quad) PF (>6 Mo Flulaval, Fluarix, and >3 Yrs Afluria, Fluzone 50630) 11/15/2016, 08/30/2017, 09/11/2018, 11/06/2019, 11/04/2020    Influenza Vaccine Split 10/26/2012    Influenza Vaccine Whole 10/29/2010    TD Vaccine 11/03/2006    Tdap 02/28/2017       Review of Systems:   As above included in HPI.   Otherwise 11 point review of systems negative including constitutional, skin, HENT, eyes, respiratory, cardiovascular, gastrointestinal, genitourinary, musculoskeletal, endocrine, hematologic, allergy, and neurologic. Physical:   Visit Vitals  /78   Pulse 79   Temp 97.9 °F (36.6 °C) (Temporal)   Resp 16   Ht 5' 4\" (1.626 m)   Wt 150 lb (68 kg)   SpO2 99%   BMI 25.75 kg/m²      Wt Readings from Last 3 Encounters:   01/18/21 150 lb (68 kg)   07/07/20 151 lb 6.4 oz (68.7 kg)   09/16/19 153 lb 12.8 oz (69.8 kg)         Exam:   Physical Exam  Constitutional:       Appearance: Normal appearance. HENT:      Head: Normocephalic and atraumatic. Right Ear: Tympanic membrane, ear canal and external ear normal.      Left Ear: Tympanic membrane, ear canal and external ear normal.      Mouth/Throat:      Mouth: Mucous membranes are moist.   Eyes:      Extraocular Movements: Extraocular movements intact. Neck:      Musculoskeletal: Normal range of motion. Cardiovascular:      Rate and Rhythm: Normal rate and regular rhythm. Pulses: Normal pulses. Heart sounds: Normal heart sounds. Comments: No edema noted to quang LEs  Pulmonary:      Effort: Pulmonary effort is normal. No respiratory distress. Breath sounds: Normal breath sounds. Abdominal:      General: Abdomen is flat. Bowel sounds are normal.      Palpations: Abdomen is soft. Musculoskeletal: Normal range of motion. Comments: No limitation noted    Skin:     General: Skin is warm and dry. Neurological:      Mental Status: She is alert and oriented to person, place, and time. Psychiatric:         Mood and Affect: Mood normal.         Behavior: Behavior normal.        Body mass index is 25.75 kg/m².      Review of Data:  Labs reviewed: N/A      Impression:  Patient Active Problem List   Diagnosis Code    Allergic rhinitis due to allergen J30.9    Primary hypertension I10    Cervical radiculopathy M54.12    Long term current use of non-steroidal anti-inflammatories (NSAID) Z79.1 Plan:  1. Encounter to establish care with new doctor  Patient is transferring to me from their previous provider. I spent no less than 30 minutes reviewing and updating the chart to include previous labs, diagnostics, and specialty notes. 2. Primary hypertension    - hydroCHLOROthiazide (HYDRODIURIL) 25 mg tablet; Take 1 Tab by mouth daily. Dispense: 90 Tab; Refill: 1    3. Cervical radiculopathy    - cyclobenzaprine (FLEXERIL) 10 mg tablet; Take 1 Tab by mouth three (3) times daily as needed for Muscle Spasm(s) (Muscle spasms). Dispense: 30 Tab; Refill: 5  - meloxicam (MOBIC) 15 mg tablet; Take 1 Tab by mouth daily. Dispense: 90 Tab; Refill: 1    4. Long term current use of non-steroidal anti-inflammatories (NSAID)  Have cautioned patient on the use of NSAIDs and the need to watch for elevations in BP, ankle edema, SOB or wheezing, blood-tinged vomiting, blood in stool or allergic reaction. If these symptoms occur, patient is to stop the medication immediately and contact the office. Patient verbalized understanding. Instructed pt not to take any NSAIDs such as Motrin, Ibuprofen, Aleve or BC powder while taking this NSAID. Patient verbalized understanding. Initiated PPI to protect gastric lining while on long term use of NSAIDs. - omeprazole (PRILOSEC) 20 mg capsule; Take 1 Cap by mouth daily as needed (reflux). Dispense: 90 Cap; Refill: 1        Health wellness form completed. See scanned media. Follow up 6 months  Labs needed 1 week prior to appt: No    Dr. Watson Pearson, AGNP-C, DNP  [facility]:   40 minutes with the patient on counseling, answering questions, and/or coordination of care. Complex medical review of medical history, lab results, and testing. Complicated management plan formulated.

## 2021-04-21 ENCOUNTER — DOCUMENTATION ONLY (OUTPATIENT)
Dept: INTERNAL MEDICINE CLINIC | Age: 56
End: 2021-04-21

## 2021-06-29 DIAGNOSIS — I10 PRIMARY HYPERTENSION: ICD-10-CM

## 2021-06-29 RX ORDER — HYDROCHLOROTHIAZIDE 25 MG/1
TABLET ORAL
Qty: 90 TABLET | Refills: 1 | OUTPATIENT
Start: 2021-06-29

## 2021-07-19 ENCOUNTER — OFFICE VISIT (OUTPATIENT)
Dept: INTERNAL MEDICINE CLINIC | Age: 56
End: 2021-07-19
Payer: COMMERCIAL

## 2021-07-19 VITALS
DIASTOLIC BLOOD PRESSURE: 78 MMHG | OXYGEN SATURATION: 98 % | RESPIRATION RATE: 16 BRPM | SYSTOLIC BLOOD PRESSURE: 138 MMHG | TEMPERATURE: 97 F | HEIGHT: 64 IN | BODY MASS INDEX: 26.36 KG/M2 | WEIGHT: 154.4 LBS | HEART RATE: 67 BPM

## 2021-07-19 DIAGNOSIS — I10 PRIMARY HYPERTENSION: Primary | ICD-10-CM

## 2021-07-19 DIAGNOSIS — D17.1 LIPOMA OF BACK: ICD-10-CM

## 2021-07-19 DIAGNOSIS — M54.12 CERVICAL RADICULOPATHY: ICD-10-CM

## 2021-07-19 DIAGNOSIS — Z79.1 LONG TERM CURRENT USE OF NON-STEROIDAL ANTI-INFLAMMATORIES (NSAID): ICD-10-CM

## 2021-07-19 PROCEDURE — 99214 OFFICE O/P EST MOD 30 MIN: CPT | Performed by: NURSE PRACTITIONER

## 2021-07-19 RX ORDER — HYDROCHLOROTHIAZIDE 25 MG/1
25 TABLET ORAL DAILY
Qty: 90 TABLET | Refills: 1 | Status: SHIPPED | OUTPATIENT
Start: 2021-07-19 | End: 2022-01-04

## 2021-07-19 RX ORDER — MELOXICAM 15 MG/1
15 TABLET ORAL DAILY
Qty: 90 TABLET | Refills: 1 | Status: SHIPPED | OUTPATIENT
Start: 2021-07-19 | End: 2022-04-13

## 2021-07-19 RX ORDER — OMEPRAZOLE 20 MG/1
20 CAPSULE, DELAYED RELEASE ORAL
Qty: 90 CAPSULE | Refills: 1 | Status: SHIPPED | OUTPATIENT
Start: 2021-07-19 | End: 2021-07-25 | Stop reason: SDUPTHER

## 2021-07-19 NOTE — PROGRESS NOTES
Chief Complaint   Patient presents with    Hypertension     6 mo f/u    Pain (Chronic)       1. Have you been to the ER, urgent care clinic since your last visit? Hospitalized since your last visit? No    2. Have you seen or consulted any other health care providers outside of the [address] [place] [address] since your last visit? Include any pap smears or colon screening.  No

## 2021-07-19 NOTE — PROGRESS NOTES
[facility] [id number] [place] [address]  [place], [address]  [phone] [facility]/[phone] fax    7/19/2021    HPI:   Christi Keys Pipkin 1965 is a pleasant WHITE/NON- female who presents today for routine follow up. 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 Menopausal sx: taking OTC Estroven which is effective for her symptoms. Chronic neck pain: she has hx of cervical disc bulge and stenosis. She takes flexeril 10mg as needed 2-3x/w depending on her muscle stiffness. She also takes meloxicam daily. She has monthly massages to help with muscle stiffness and discomforts. She experience numbness and tingling in her Rt hand at times; depending on activity. This resolves on its own. HTN: taking HCTZ as prescribed and tolerating well. She checks her BP periodically. Lipoma: she was told a few years ago by her previous PCP she had a lipoma on her upper back near her right shoulder blade. She denies pain and it has not changed in size. Past Medical History:   Diagnosis Date    HSV infection     Type 2    Hypertension     IBS (irritable bowel syndrome)     Lateral epicondylitis of right elbow 4/22/2016    Nephrolithiasis 9/11/2018     Past Surgical History:   Procedure Laterality Date    HX BREAST BIOPSY Left 1/2/2015    LEFT NIPPLE DUCT EXPLRATION WITH EXCISIONAL BIOPSY performed by Roshni Obregon MD at [address] [place] [address] HX BREAST BIOPSY Right 1/22/2016    WIDE LOCAL EXCISION WITH NEEDLE LOCALIZATION MAMMOGRAM RIGHT BREAST LESION performed by Roshni Obregon MD at [address] [place] [address] HX COLONOSCOPY  10/03/2016    Normal tissue, 10 years    HI EXCISE BREAST LES W XRAY MARKER Right 1-22-16    Dr. Stephen Brennan     Current Outpatient Medications   Medication Sig    meloxicam (MOBIC) 15 mg tablet Take 1 Tablet by mouth daily.  omeprazole (PRILOSEC) 20 mg capsule Take 1 Capsule by mouth daily as needed (Gut protection). Indications: stomach ulcer from aspirin/ibuprofen-like drugs prevention    hydroCHLOROthiazide (HYDRODIURIL) 25 mg tablet Take 1 Tablet by mouth daily.  cyclobenzaprine (FLEXERIL) 10 mg tablet Take 1 Tab by mouth three (3) times daily as needed for Muscle Spasm(s) (Muscle spasms).  cholecalciferol, vitamin D3, (VITAMIN D3) 2,000 unit tab 1 tablet by mouth daily    valacyclovir (VALTREX) 1 gram tablet Take 1 tablet by mouth two (2) times a day. No current facility-administered medications for this visit. Allergies and Intolerances: Allergies   Allergen Reactions    Clindamycin Other (comments)     Bowel infection.  Pcn [Penicillins] Unknown (comments)    Tramadol Nausea Only     Family History:   Family History   Problem Relation Age of Onset    Cancer Mother 62        breast    Breast Cancer Mother 79    Arthritis-osteo Mother     Diabetes Mother     Heart Disease Mother     Hypertension Mother     Lung Disease Mother         COPD    Heart Attack Father 48        he was a smoker    Heart Disease Father     Lung Disease Father         COPD    Breast Cancer Maternal Aunt 79    Breast Cancer Maternal Grandmother 64    Cancer Maternal Grandmother     Heart Disease Maternal Grandmother     Hypertension Maternal Grandmother     Cancer Maternal Aunt      Social History:   She  reports that she quit smoking about 2 years ago. Her smoking use included cigarettes. She started smoking about 38 years ago. She has a 35.00 pack-year smoking history.  She has never used smokeless tobacco.   Social History     Substance and Sexual Activity   Alcohol Use Yes    Comment: once in a while     Immunization History:  Immunization History   Administered Date(s) Administered    Covid-19, MODERNA, Mrna, Lnp-s, Pf, 100mcg/0.5mL 02/24/2021, 03/24/2021    Influenza Vaccine 10/28/2013    Influenza Vaccine ([address] [place] [address]) PF (>6 Mo Flulaval, Fluarix, and >3 Yrs [address] [place] [address] Street, Fluzone [address]) 11/15/2016, 08/30/2017, 09/11/2018, 11/06/2019, 11/04/2020    Influenza Vaccine Split 10/26/2012    Influenza Vaccine Whole 10/29/2010    TD Vaccine 11/03/2006    Tdap 02/28/2017       Review of Systems:   As above included in HPI. Otherwise 11 point review of systems negative including constitutional, skin, HENT, eyes, respiratory, cardiovascular, gastrointestinal, genitourinary, musculoskeletal, endocrine, hematologic, allergy, and neurologic. Physical:   Visit Vitals  /78   Pulse 67   Temp 97 °F (36.1 °C) (Temporal)   Resp 16   Ht 5' 4\" (1.626 m)   Wt 154 lb 6.4 oz (70 kg)   SpO2 98%   BMI 26.50 kg/m²      Wt Readings from Last 3 Encounters:   07/19/21 154 lb 6.4 oz (70 kg)   01/18/21 150 lb (68 kg)   07/07/20 151 lb 6.4 oz (68.7 kg)         Exam:   Physical Exam  Constitutional:       Appearance: Normal appearance. HENT:      Head: Normocephalic and atraumatic. Right Ear: External ear normal.      Left Ear: External ear normal.      Mouth/Throat:      Mouth: Mucous membranes are moist.   Eyes:      Extraocular Movements: Extraocular movements intact. Conjunctiva/sclera: Conjunctivae normal.   Cardiovascular:      Rate and Rhythm: Normal rate and regular rhythm. Pulses: Normal pulses. Heart sounds: Normal heart sounds. Comments: No edema noted to quang LEs  Pulmonary:      Effort: Pulmonary effort is normal. No respiratory distress. Breath sounds: Normal breath sounds. No wheezing. Musculoskeletal:         General: Normal range of motion. Cervical back: Normal range of motion. Comments: No limitation noted    Skin:     General: Skin is warm and dry. Neurological:      General: No focal deficit present. Mental Status: She is alert and oriented to person, place, and time.    Psychiatric:         Mood and Affect: Mood normal.         Behavior: Behavior normal.          Review of Data:  Labs reviewed: N/A      Impression:  Patient Active Problem List   Diagnosis Code    Allergic rhinitis due to allergen J30.9    Primary hypertension I10    Cervical radiculopathy M54.12    Long term current use of non-steroidal anti-inflammatories (NSAID) Z79.1    Lipoma of back D17.1       Plan:    ICD-10-CM ICD-9-CM    1. Primary hypertension  I10 401.9 hydroCHLOROthiazide (HYDRODIURIL) 25 mg tablet      METABOLIC PANEL, COMPREHENSIVE   2. Cervical radiculopathy  M54.12 723.4 meloxicam (MOBIC) 15 mg tablet   3. Long term current use of non-steroidal anti-inflammatories (NSAID)  Z79.1 V58.64 omeprazole (PRILOSEC) 20 mg capsule   4. Lipoma of back  D17.1 214.8        -Primary hypertension  Continue HCTZ  Reduce sodium in diet  Avoid all pork  Initiate cardio exercise  Increase water intake  Report BP readings greater than 139/89 to office  Discussed possibly adding additional med if BP over 140/90 at follow up appt. -Cervical radiculopathy  Continue flexeril and Mobic therapy as needed    -Long term current use of non-steroidal anti-inflammatories (NSAID)  Have cautioned patient on the use of NSAIDs and the need to watch for elevations in BP, ankle edema, SOB or wheezing, blood-tinged vomiting, blood in stool or allergic reaction. If these symptoms occur, patient is to stop the medication immediately and contact the office. Patient verbalized understanding. Instructed pt not to take any NSAIDs such as Motrin, Ibuprofen, Aleve or BC powder while taking this NSAID. Patient verbalized understanding. Initiated PPI 6 months ago, pt didn't obtain. Explained the importance of protecting gut while taking NSAIDs for long term.  She will obtain today.     -Lipoma  No intervention needed      Follow up 6 months  Labs needed 1 week prior to appt: yes  CMP    Dr. Sonu Smith, AGNP-C, DNP  [facility] [address] [place] [address]

## 2021-07-25 DIAGNOSIS — Z79.1 LONG TERM CURRENT USE OF NON-STEROIDAL ANTI-INFLAMMATORIES (NSAID): ICD-10-CM

## 2021-07-26 RX ORDER — OMEPRAZOLE 20 MG/1
20 CAPSULE, DELAYED RELEASE ORAL
Qty: 90 CAPSULE | Refills: 1 | Status: SHIPPED | OUTPATIENT
Start: 2021-07-26 | End: 2022-01-13

## 2021-07-26 NOTE — TELEPHONE ENCOUNTER
Requested Prescriptions     Signed Prescriptions Disp Refills    omeprazole (PRILOSEC) 20 mg capsule 90 Capsule 1     Sig: Take 1 Capsule by mouth daily as needed (Gut protection).  Indications: stomach ulcer from aspirin/ibuprofen-like drugs prevention     Authorizing Provider: Jaelyn Gan

## 2021-07-27 ENCOUNTER — DOCUMENTATION ONLY (OUTPATIENT)
Dept: INTERNAL MEDICINE CLINIC | Age: 56
End: 2021-07-27

## 2021-07-27 NOTE — PROGRESS NOTES
Prior Auth form for Omeprazole 20mg faxed to [facility].     Prior Auth for Omeprazole 20mg approved from 7/29/21-7/29/24

## 2021-09-07 DIAGNOSIS — M54.12 CERVICAL RADICULOPATHY: ICD-10-CM

## 2021-09-07 RX ORDER — CYCLOBENZAPRINE HCL 10 MG
TABLET ORAL
Qty: 30 TABLET | Refills: 5 | Status: SHIPPED | OUTPATIENT
Start: 2021-09-07 | End: 2022-05-17

## 2021-12-24 ENCOUNTER — TRANSCRIBE ORDER (OUTPATIENT)
Dept: SCHEDULING | Age: 56
End: 2021-12-24

## 2021-12-24 DIAGNOSIS — Z12.31 ENCOUNTER FOR SCREENING MAMMOGRAM FOR BREAST CANCER: Primary | ICD-10-CM

## 2022-01-04 ENCOUNTER — HOSPITAL ENCOUNTER (OUTPATIENT)
Dept: MAMMOGRAPHY | Age: 57
Discharge: HOME | End: 2022-01-04
Attending: NURSE PRACTITIONER
Payer: COMMERCIAL

## 2022-01-04 DIAGNOSIS — I10 PRIMARY HYPERTENSION: ICD-10-CM

## 2022-01-04 DIAGNOSIS — Z12.31 ENCOUNTER FOR SCREENING MAMMOGRAM FOR BREAST CANCER: ICD-10-CM

## 2022-01-04 PROCEDURE — 77063 BREAST TOMOSYNTHESIS BI: CPT

## 2022-01-04 RX ORDER — HYDROCHLOROTHIAZIDE 25 MG/1
TABLET ORAL
Qty: 90 TABLET | Refills: 1 | Status: SHIPPED | OUTPATIENT
Start: 2022-01-04 | End: 2022-01-25 | Stop reason: ALTCHOICE

## 2022-01-06 NOTE — PROGRESS NOTES
[facility] nurses: Please notify pt that her mammogram was normal. Pt will need yearly mammograms.  Thank you

## 2022-01-12 DIAGNOSIS — Z79.1 LONG TERM CURRENT USE OF NON-STEROIDAL ANTI-INFLAMMATORIES (NSAID): ICD-10-CM

## 2022-01-13 RX ORDER — OMEPRAZOLE 20 MG/1
CAPSULE, DELAYED RELEASE ORAL
Qty: 90 CAPSULE | Refills: 1 | Status: SHIPPED | OUTPATIENT
Start: 2022-01-13 | End: 2022-06-07

## 2022-01-25 ENCOUNTER — HOSPITAL ENCOUNTER (OUTPATIENT)
Dept: LAB | Age: 57
Discharge: HOME | End: 2022-01-25
Payer: COMMERCIAL

## 2022-01-25 ENCOUNTER — OFFICE VISIT (OUTPATIENT)
Dept: INTERNAL MEDICINE CLINIC | Age: 57
End: 2022-01-25
Payer: COMMERCIAL

## 2022-01-25 VITALS
HEIGHT: 64 IN | DIASTOLIC BLOOD PRESSURE: 100 MMHG | HEART RATE: 78 BPM | OXYGEN SATURATION: 100 % | BODY MASS INDEX: 26.94 KG/M2 | RESPIRATION RATE: 18 BRPM | TEMPERATURE: 97.5 F | SYSTOLIC BLOOD PRESSURE: 152 MMHG | WEIGHT: 157.8 LBS

## 2022-01-25 DIAGNOSIS — R63.5 WEIGHT GAIN: ICD-10-CM

## 2022-01-25 DIAGNOSIS — E78.1 HYPERTRIGLYCERIDEMIA: ICD-10-CM

## 2022-01-25 DIAGNOSIS — F17.210 LIGHT SMOKER: ICD-10-CM

## 2022-01-25 DIAGNOSIS — Z13.220 SCREENING CHOLESTEROL LEVEL: ICD-10-CM

## 2022-01-25 DIAGNOSIS — Z12.2 SCREENING FOR LUNG CANCER: ICD-10-CM

## 2022-01-25 DIAGNOSIS — R91.8 MULTIPLE LUNG NODULES ON CT: Primary | ICD-10-CM

## 2022-01-25 DIAGNOSIS — M48.02 FORAMINAL STENOSIS OF CERVICAL REGION: ICD-10-CM

## 2022-01-25 DIAGNOSIS — I10 PRIMARY HYPERTENSION: ICD-10-CM

## 2022-01-25 DIAGNOSIS — M54.12 CERVICAL RADICULOPATHY: ICD-10-CM

## 2022-01-25 DIAGNOSIS — I10 PRIMARY HYPERTENSION: Primary | ICD-10-CM

## 2022-01-25 LAB
ALBUMIN SERPL-MCNC: 4.1 G/DL (ref 3.4–5)
ALBUMIN/GLOB SERPL: 1.5 {RATIO} (ref 0.8–1.7)
ALP SERPL-CCNC: 75 U/L (ref 45–117)
ALT SERPL-CCNC: 25 U/L (ref 13–56)
ANION GAP SERPL CALC-SCNC: 4 MMOL/L (ref 3–18)
AST SERPL-CCNC: 19 U/L (ref 10–38)
BILIRUB SERPL-MCNC: 0.3 MG/DL (ref 0.2–1)
BUN SERPL-MCNC: 16 MG/DL (ref 7–18)
BUN/CREAT SERPL: 19 (ref 12–20)
CALCIUM SERPL-MCNC: 9.5 MG/DL (ref 8.5–10.1)
CHLORIDE SERPL-SCNC: 106 MMOL/L (ref 100–111)
CHOLEST SERPL-MCNC: 164 MG/DL
CO2 SERPL-SCNC: 30 MMOL/L (ref 21–32)
CREAT SERPL-MCNC: 0.83 MG/DL (ref 0.6–1.3)
GLOBULIN SER CALC-MCNC: 2.7 G/DL (ref 2–4)
GLUCOSE SERPL-MCNC: 87 MG/DL (ref 74–99)
HDLC SERPL-MCNC: 55 MG/DL (ref 40–60)
HDLC SERPL: 3 {RATIO} (ref 0–5)
LDLC SERPL CALC-MCNC: 72.6 MG/DL (ref 0–100)
LIPID PROFILE,FLP: ABNORMAL
POTASSIUM SERPL-SCNC: 3.7 MMOL/L (ref 3.5–5.5)
PROT SERPL-MCNC: 6.8 G/DL (ref 6.4–8.2)
SODIUM SERPL-SCNC: 140 MMOL/L (ref 136–145)
TRIGL SERPL-MCNC: 182 MG/DL (ref ?–150)
VLDLC SERPL CALC-MCNC: 36.4 MG/DL

## 2022-01-25 PROCEDURE — 80053 COMPREHEN METABOLIC PANEL: CPT

## 2022-01-25 PROCEDURE — 36415 COLL VENOUS BLD VENIPUNCTURE: CPT

## 2022-01-25 PROCEDURE — 99214 OFFICE O/P EST MOD 30 MIN: CPT | Performed by: NURSE PRACTITIONER

## 2022-01-25 PROCEDURE — 80061 LIPID PANEL: CPT

## 2022-01-25 RX ORDER — DULOXETIN HYDROCHLORIDE 30 MG/1
30 CAPSULE, DELAYED RELEASE ORAL DAILY
Qty: 60 CAPSULE | Refills: 0 | Status: SHIPPED | OUTPATIENT
Start: 2022-01-25 | End: 2022-03-14 | Stop reason: SDUPTHER

## 2022-01-25 RX ORDER — LISINOPRIL AND HYDROCHLOROTHIAZIDE 20; 25 MG/1; MG/1
1 TABLET ORAL DAILY
Qty: 90 TABLET | Refills: 1 | Status: SHIPPED | OUTPATIENT
Start: 2022-01-25 | End: 2022-07-05

## 2022-01-25 NOTE — PROGRESS NOTES
Chief Complaint   Patient presents with    Follow-up     6 months     1. \"Have you been to the ER, urgent care clinic since your last visit? Hospitalized since your last visit? \" No    2. \"Have you seen or consulted any other health care providers outside of the [address] [place] [address] since your last visit? \" No     3. For patients aged 39-70: Has the patient had a colonoscopy / FIT/ Cologuard? Yes - no Care Gap present      If the patient is female:    4. For patients aged 41-77: Has the patient had a mammogram within the past 2 years? Yes - no Care Gap present  See top three    5. For patients aged 21-65: Has the patient had a pap smear?  Yes - no Care Gap present

## 2022-01-25 NOTE — PROGRESS NOTES
[facility] [id number], [address]  [phone] YBJDLD/[phone] fax    1/25/2022    Theodora Home Pipkin 1965 is a pleasant [address] female. 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 Past Medical History:   Diagnosis Date    HSV infection     Type 2    Hypertension     IBS (irritable bowel syndrome)     Lateral epicondylitis of right elbow 4/22/2016    Nephrolithiasis 9/11/2018     Past Surgical History:   Procedure Laterality Date    HX BREAST BIOPSY Left 1/2/2015    LEFT NIPPLE DUCT EXPLRATION WITH EXCISIONAL BIOPSY performed by Eliecer Nunn MD at [address] [place] [address] HX BREAST BIOPSY Right 1/22/2016    WIDE LOCAL EXCISION WITH NEEDLE LOCALIZATION MAMMOGRAM RIGHT BREAST LESION performed by Eliecer Nunn MD at [address] [place] [address] HX COLONOSCOPY  10/03/2016    Normal tissue, 10 years    SD EXCISE BREAST LES W XRAY MARKER Right 1-22-16    Dr. Elizabeth Reno     Current Outpatient Medications   Medication Sig    DULoxetine (CYMBALTA) 30 mg capsule Take 1 Capsule by mouth daily.  lisinopril-hydroCHLOROthiazide (PRINZIDE, ZESTORETIC) 20-25 mg per tablet Take 1 Tablet by mouth daily. For blood pressure    omeprazole (PRILOSEC) 20 mg capsule TAKE 1 CAPSULE DAILY AS NEEDED (GUT PROTECTION). FOR STOMACH ULCER FROM ASPIRIN/IBUPROFEN-LIKE DRUGS PREVENTION.  cyclobenzaprine (FLEXERIL) 10 mg tablet TAKE 1 TABLET 3 TIMES A DAYAS NEEDED FOR MUSCLE SPASMS    meloxicam (MOBIC) 15 mg tablet Take 1 Tablet by mouth daily.  cholecalciferol, vitamin D3, (VITAMIN D3) 2,000 unit tab 1 tablet by mouth daily    valacyclovir (VALTREX) 1 gram tablet Take 1 tablet by mouth two (2) times a day. No current facility-administered medications for this visit. Allergies and Intolerances: Allergies   Allergen Reactions    Clindamycin Other (comments)     Bowel infection.     Pcn [Penicillins] Unknown (comments)    Tramadol Nausea Only     Family History:   Family History   Problem Relation Age of Onset   [facility] Cancer Mother 62        breast    Breast Cancer Mother 79    OSTEOARTHRITIS Mother     Diabetes Mother     Heart Disease Mother     Hypertension Mother     Lung Disease Mother         COPD    Heart Attack Father 48        he was a smoker    Heart Disease Father     Lung Disease Father         COPD    Breast Cancer Maternal Aunt 79    Breast Cancer Maternal Grandmother 64    Cancer Maternal Grandmother     Heart Disease Maternal Grandmother     Hypertension Maternal Grandmother     Cancer Maternal Aunt      Social History:   She  reports that she has quit smoking. Her smoking use included cigarettes. She started smoking about 38 years ago. She has a 35.00 pack-year smoking history. She has never used smokeless tobacco.   Social History     Substance and Sexual Activity   Alcohol Use Yes    Comment: once in a while     Immunization History:  Immunization History   Administered Date(s) Administered    COVID-19, Moderna Booster, PF, 0.25mL Dose 11/23/2021    COVID-19, Debbrah Neighbor, Primary or Immunocompromised Series, MRNA, PF, 100mcg/0.5mL 02/24/2021, 03/24/2021    Influenza Vaccine 10/28/2013    Influenza Vaccine (Quad) Mdck Pf (>2 Yrs Flucelvax QUAD 20076) 10/27/2021    Influenza Vaccine (Quad) PF (>6 Mo Flulaval, Fluarix, and >3 Yrs Afluria, Fluzone [id number]) 11/15/2016, 08/30/2017, 09/11/2018, 11/06/2019, 11/04/2020    Influenza Vaccine Split 10/26/2012    Influenza Vaccine Whole 10/29/2010    TD Vaccine 11/03/2006    Tdap 02/28/2017    Zoster Recombinant 10/27/2021       Todays concerns:  1. Chronic pain right neck to right shoulder. Continues meloxicam and Flexeril but symptoms are worsening in duration and severity. Pain can be 6/10. Activity flares her symptoms. Despite taking Flexeril meloxicam her discomforts last for days at a time.   In the past she has tried physical therapy which was not effective. She continues monthly massage therapies. Last month they flareup \"aggravated\" her almost the entire month. She saw an orthopedist many years ago but she felt like they pushed her off.  2.  Smoking. She has restarted smoking approximately 1 to 4 cigarettes a few times a week. 3.  HM. She completed her Covid booster. She has obtained one shingles vaccine and is soon to be due for the second. She received this at the "[facility] pharmacy. She completed her mammogram 1/2022. Menopausal sx: taking OTC Estroven which is effective for her symptoms. Chronic neck pain: Diagnosed 2012 with cervical disc bulge and stenosis. She takes flexeril 10mg as needed 2-3x/w depending on her muscle stiffness. She also takes meloxicam daily. She has monthly massages to help with muscle stiffness and discomforts. She experience numbness and tingling in her Rt hand at times; depending on activity. This resolves on its own but it can take weeks. HTN: taking HCTZ as prescribed and tolerating well. Lipoma: she was told a few years ago by her previous PCP she had a lipoma on her upper back near her right shoulder blade. Review of Systems:   As above included in HPI. Otherwise 11 point review of systems negative including constitutional, skin, HENT, eyes, respiratory, cardiovascular, gastrointestinal, genitourinary, musculoskeletal, endocrine, hematologic, allergy, and neurologic. Review of Data:  Patient did not complete her CMP prior to today's appointment. Will obtain today    Physical:   Visit Vitals  BP (!) 159/100   Pulse 78   Temp 97.5 °F (36.4 °C) (Temporal)   Resp 18   Ht 5' 4\" (1.626 m)   Wt 157 lb 12.8 oz (71.6 kg)   SpO2 100%   BMI 27.09 kg/m²      Wt Readings from Last 3 Encounters:   01/25/22 157 lb 12.8 oz (71.6 kg)   07/19/21 154 lb 6.4 oz (70 kg)   01/18/21 150 lb (68 kg)       Exam:   Physical Exam  Constitutional:       Appearance: Normal appearance. She is obese.    HENT: Head: Normocephalic and atraumatic. Right Ear: External ear normal.      Left Ear: External ear normal.   Eyes:      Extraocular Movements: Extraocular movements intact. Conjunctiva/sclera: Conjunctivae normal.   Neck:      Vascular: No carotid bruit. Cardiovascular:      Rate and Rhythm: Normal rate and regular rhythm. Heart sounds: Normal heart sounds. Comments: No edema noted to quang LEs  Pulmonary:      Effort: Pulmonary effort is normal. No respiratory distress. Breath sounds: Normal breath sounds. No wheezing. Abdominal:      Palpations: Abdomen is soft. Musculoskeletal:         General: Normal range of motion. Cervical back: Normal range of motion. Back:       Comments: Location of discomfort. No limitations noted  Gait stable   Skin:     General: Skin is warm and dry. Neurological:      General: No focal deficit present. Mental Status: She is alert and oriented to person, place, and time. Psychiatric:         Mood and Affect: Mood normal.         Behavior: Behavior normal.        Body mass index is 27.09 kg/m². Plan:    ICD-10-CM ICD-9-CM    1. Primary hypertension  I10 401.9 lisinopril-hydroCHLOROthiazide (PRINZIDE, ZESTORETIC) 20-25 mg per tablet      METABOLIC PANEL, COMPREHENSIVE   2. Cervical radiculopathy  M54.12 723.4    3. Foraminal stenosis of cervical region  M48.02 723.0 DULoxetine (CYMBALTA) 30 mg capsule   4. Weight gain  R63.5 783.1    5. Light smoker  F17.210 305.1    6. Screening for lung cancer  Z12.2 V76.0 CT LOW DOSE LUNG CANCER SCREENING   7. Screening cholesterol level  Z13.220 V77.91 LIPID PANEL     -Primary hypertension  3 separate BP readings all of which elevated > 139/89  Discontinue HCTZ  Initiate Prinzide 20/25 mg daily  Reduce sodium in diet  Avoid all pork  Initiate cardio exercise  Increase water intake  Obtain Omron wrist cuff and check blood pressure 2 hours after taking BP medication.   Report BP readings greater than 139/89 to office    -Cervical radiculopathy/foraminal stenosis  Reviewed cervical MRI from 2012  Continue flexeril and Mobic therapy as needed  Initiate Cymbalta 30 mg daily. Instructed patient to take Cymbalta daily even if not having discomfort. She is to take Cymbalta 2 tablets or twice daily if she has a flareup. Trialing this medication to see if we are possibly dealing with nerve pain or muscle spasms as patient cannot distinguish between the 2. Patient and I have agreed to hold off on imaging while trialing other medication therapies. She is not seeking referral to Ortho at this time.    -Long term current use of non-steroidal anti-inflammatories (NSAID)  Have cautioned patient on the use of NSAIDs and the need to watch for elevations in BP, ankle edema, SOB or wheezing, blood-tinged vomiting, blood in stool or allergic reaction. If these symptoms occur, patient is to stop the medication immediately and contact the office. Patient verbalized understanding. Instructed pt not to take any NSAIDs such as Motrin, Ibuprofen, Aleve or BC powder while taking this NSAID. Patient verbalized understanding. Continue PPI while taking NSAID. Weight gain 6 pounds  6 pounds in 6 months  BMI is out of normal parameters and plan is as follows: Discussed in great detail on diet, portion control, exercise, avoiding foods high in sugar, carbs and starches, fatty/greasy foods and to eat until satisfied not til full. I have counseled this patient on diet and exercise regimens as well. Light smoker  She originally stated that she quit smoking 2/20/2019 but unfortunately has resorted back to smoking 1 to 4 cigarettes a few times a week. Counseled patient on the dangers of tobacco use, and was advised to quit.    Discussed the risks of continued tobacco use such as elevated blood pressure, vascular irritation with increased incidence of CVD with stroke or MI and PVD causing claudication, lung damage that could lead to COPD, cancer and death. Screening for lung cancer  LDCT ordered  Instructed patient to reach out to central scheduling if she is not contacted within the next week to schedule    Screening for cholesterol  10/2020 ; LDL 61  Extensive cardiac history in the family i.e. parents  Obtain lipid level 6 months    Reviewed medication and completed medication reconciliation with the patient. Reviewed side effects of medications with the patient. Questions were answered and patient verb understanding. Follow up 6 months with labs (CMP, lipid)      Dr. Breanna Nevarez, SAMIRP-C, DNP  [facility]     The total time 35 minutes. At least 50% of that time was spent in counseling and/or coordination of care. My summary of patient counseling and coordination of care includes reviewing medical record, assessing patient, placing orders, and discussing plan of care with patient.

## 2022-01-26 PROBLEM — E78.1 HYPERTRIGLYCERIDEMIA: Status: ACTIVE | Noted: 2022-01-26

## 2022-01-27 NOTE — PROGRESS NOTES
[address] [facility] nurses, please contact patient with the following lab results  ; LDL 72. Goal for TG is less than 150. Encourage patient to reduce her oily foods and reduce her weight by burning calories.   Kidneys and liver good

## 2022-02-08 ENCOUNTER — NURSE NAVIGATOR (OUTPATIENT)
Dept: OTHER | Age: 57
End: 2022-02-08

## 2022-02-09 ENCOUNTER — HOSPITAL ENCOUNTER (OUTPATIENT)
Dept: CT IMAGING | Age: 57
Discharge: HOME | End: 2022-02-09
Attending: NURSE PRACTITIONER
Payer: COMMERCIAL

## 2022-02-09 VITALS — BODY MASS INDEX: 26.63 KG/M2 | WEIGHT: 156 LBS | HEIGHT: 64 IN

## 2022-02-09 PROCEDURE — 71271 CT THORAX LUNG CANCER SCR C-: CPT

## 2022-02-10 PROBLEM — R91.8 MULTIPLE LUNG NODULES ON CT: Status: ACTIVE | Noted: 2022-02-10

## 2022-02-11 NOTE — PROGRESS NOTES
Multiple small (less than 6mm) nodules noted. Tese are stable from previous study. Will monitor yearly.  Thank you

## 2022-03-14 DIAGNOSIS — M48.02 FORAMINAL STENOSIS OF CERVICAL REGION: ICD-10-CM

## 2022-03-14 RX ORDER — DULOXETIN HYDROCHLORIDE 30 MG/1
30 CAPSULE, DELAYED RELEASE ORAL DAILY
Qty: 90 CAPSULE | Refills: 1 | Status: SHIPPED | OUTPATIENT
Start: 2022-03-14 | End: 2022-07-18 | Stop reason: SDUPTHER

## 2022-03-14 NOTE — TELEPHONE ENCOUNTER
Last Visit: 1/25/22 with MARII Minor  Next Appointment: 7/18/22 with MARII Minor  Previous Refill Encounter(s): 1/25/22 #60    Requested Prescriptions     Pending Prescriptions Disp Refills    DULoxetine (CYMBALTA) 30 mg capsule 90 Capsule 0     Sig: Take 1 Capsule by mouth daily.

## 2022-03-14 NOTE — TELEPHONE ENCOUNTER
Requested Prescriptions     Signed Prescriptions Disp Refills    DULoxetine (CYMBALTA) 30 mg capsule 90 Capsule 1     Sig: Take 1 Capsule by mouth daily.      Authorizing Provider: Humble De Los Santos

## 2022-03-18 PROBLEM — M54.12 CERVICAL RADICULOPATHY: Status: ACTIVE | Noted: 2017-02-28

## 2022-03-18 PROBLEM — Z79.1 LONG TERM CURRENT USE OF NON-STEROIDAL ANTI-INFLAMMATORIES (NSAID): Status: ACTIVE | Noted: 2021-01-18

## 2022-03-18 PROBLEM — R91.8 MULTIPLE LUNG NODULES ON CT: Status: ACTIVE | Noted: 2022-02-10

## 2022-03-19 PROBLEM — M48.02 FORAMINAL STENOSIS OF CERVICAL REGION: Status: ACTIVE | Noted: 2022-01-25

## 2022-03-19 PROBLEM — D17.1 LIPOMA OF BACK: Status: ACTIVE | Noted: 2021-07-19

## 2022-03-19 PROBLEM — F17.210 LIGHT SMOKER: Status: ACTIVE | Noted: 2022-01-25

## 2022-03-20 PROBLEM — E78.1 HYPERTRIGLYCERIDEMIA: Status: ACTIVE | Noted: 2022-01-26

## 2022-04-12 DIAGNOSIS — M54.12 CERVICAL RADICULOPATHY: ICD-10-CM

## 2022-04-13 RX ORDER — MELOXICAM 15 MG/1
TABLET ORAL
Qty: 90 TABLET | Refills: 1 | Status: SHIPPED | OUTPATIENT
Start: 2022-04-13 | End: 2022-07-18 | Stop reason: SDUPTHER

## 2022-05-17 DIAGNOSIS — M54.12 CERVICAL RADICULOPATHY: ICD-10-CM

## 2022-05-17 RX ORDER — CYCLOBENZAPRINE HCL 10 MG
TABLET ORAL
Qty: 30 TABLET | Refills: 3 | Status: SHIPPED | OUTPATIENT
Start: 2022-05-17

## 2022-05-18 NOTE — TELEPHONE ENCOUNTER
ICD-10-CM ICD-9-CM    1.  Cervical radiculopathy  M54.12 723.4 cyclobenzaprine (FLEXERIL) 10 mg tablet

## 2022-06-06 DIAGNOSIS — Z79.1 LONG TERM CURRENT USE OF NON-STEROIDAL ANTI-INFLAMMATORIES (NSAID): ICD-10-CM

## 2022-06-07 RX ORDER — OMEPRAZOLE 20 MG/1
CAPSULE, DELAYED RELEASE ORAL
Qty: 90 CAPSULE | Refills: 1 | Status: SHIPPED | OUTPATIENT
Start: 2022-06-07

## 2022-06-07 NOTE — TELEPHONE ENCOUNTER
Requested Prescriptions     Signed Prescriptions Disp Refills    omeprazole (PRILOSEC) 20 mg capsule 90 Capsule 1     Sig: TAKE 1 CAPSULE DAILY AS    NEEDED FOR REFLUX     Authorizing Provider: Jazmin Wiley

## 2022-07-05 DIAGNOSIS — I10 PRIMARY HYPERTENSION: ICD-10-CM

## 2022-07-05 RX ORDER — LISINOPRIL AND HYDROCHLOROTHIAZIDE 20; 25 MG/1; MG/1
1 TABLET ORAL DAILY
Qty: 90 TABLET | Refills: 0 | Status: SHIPPED | OUTPATIENT
Start: 2022-07-05 | End: 2022-07-18 | Stop reason: SDUPTHER

## 2022-07-18 ENCOUNTER — OFFICE VISIT (OUTPATIENT)
Dept: INTERNAL MEDICINE CLINIC | Age: 57
End: 2022-07-18
Payer: COMMERCIAL

## 2022-07-18 VITALS
WEIGHT: 153.8 LBS | HEART RATE: 65 BPM | OXYGEN SATURATION: 98 % | TEMPERATURE: 97.8 F | SYSTOLIC BLOOD PRESSURE: 129 MMHG | RESPIRATION RATE: 18 BRPM | BODY MASS INDEX: 26.26 KG/M2 | DIASTOLIC BLOOD PRESSURE: 63 MMHG | HEIGHT: 64 IN

## 2022-07-18 DIAGNOSIS — Z79.1 LONG TERM CURRENT USE OF NON-STEROIDAL ANTI-INFLAMMATORIES (NSAID): ICD-10-CM

## 2022-07-18 DIAGNOSIS — I10 PRIMARY HYPERTENSION: Primary | ICD-10-CM

## 2022-07-18 DIAGNOSIS — M54.12 CERVICAL RADICULOPATHY: ICD-10-CM

## 2022-07-18 DIAGNOSIS — M48.02 FORAMINAL STENOSIS OF CERVICAL REGION: ICD-10-CM

## 2022-07-18 PROCEDURE — 99213 OFFICE O/P EST LOW 20 MIN: CPT | Performed by: NURSE PRACTITIONER

## 2022-07-18 RX ORDER — LISINOPRIL AND HYDROCHLOROTHIAZIDE 20; 25 MG/1; MG/1
1 TABLET ORAL DAILY
Qty: 90 TABLET | Refills: 0 | Status: SHIPPED | OUTPATIENT
Start: 2022-10-01

## 2022-07-18 RX ORDER — DULOXETIN HYDROCHLORIDE 30 MG/1
30 CAPSULE, DELAYED RELEASE ORAL DAILY
Qty: 90 CAPSULE | Refills: 1 | Status: SHIPPED | OUTPATIENT
Start: 2022-07-18

## 2022-07-18 RX ORDER — MELOXICAM 15 MG/1
15 TABLET ORAL
Qty: 90 TABLET | Refills: 1 | Status: SHIPPED | OUTPATIENT
Start: 2022-07-18

## 2022-07-18 NOTE — PROGRESS NOTES
Internists of [id number] [place] [address]  [place], [address]  [phone] [facility]/[phone] fax    7/18/2022    Jorden Couch Pipkin 1965 is a pleasant [address] [facility] female. 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 Past Medical History:   Diagnosis Date    HSV infection     Type 2    Hypertension     Hypertriglyceridemia 1/26/2022    IBS (irritable bowel syndrome)     Lateral epicondylitis of right elbow 4/22/2016    Nephrolithiasis 9/11/2018     Past Surgical History:   Procedure Laterality Date    HX BREAST BIOPSY Left 1/2/2015    LEFT NIPPLE DUCT EXPLRATION WITH EXCISIONAL BIOPSY performed by Jewel Navas MD at [address] [place] [address] HX BREAST BIOPSY Right 1/22/2016    WIDE LOCAL EXCISION WITH NEEDLE LOCALIZATION MAMMOGRAM RIGHT BREAST LESION performed by Jewle Navas MD at [address] [place] [address] HX COLONOSCOPY  10/03/2016    Normal tissue, 10 years    LA EXCISE BREAST LES W XRAY MARKER Right 1-22-16    Dr. Mayuri Choudhary     Current Outpatient Medications   Medication Sig    [START ON 10/1/2022] lisinopril-hydroCHLOROthiazide (PRINZIDE, ZESTORETIC) 20-25 mg per tablet Take 1 Tablet by mouth daily. For blood pressure    DULoxetine (CYMBALTA) 30 mg capsule Take 1 Capsule by mouth daily.  meloxicam (MOBIC) 15 mg tablet Take 1 Tablet by mouth daily as needed for Pain.  omeprazole (PRILOSEC) 20 mg capsule TAKE 1 CAPSULE DAILY AS    NEEDED FOR REFLUX    cyclobenzaprine (FLEXERIL) 10 mg tablet TAKE 1 TABLET 3 TIMES DAILYAS NEEDED FOR MUSCLE SPASM    cholecalciferol, vitamin D3, (VITAMIN D3) 2,000 unit tab 1 tablet by mouth daily    valacyclovir (VALTREX) 1 gram tablet Take 1 tablet by mouth two (2) times a day. No current facility-administered medications for this visit. Allergies and Intolerances: Allergies   Allergen Reactions    Clindamycin Other (comments)     Bowel infection.     Pcn [Penicillins] Unknown (comments)  Tramadol Nausea Only     Family History:   Family History   Problem Relation Age of Onset   [facility] Cancer Mother 62        breast    Breast Cancer Mother 79    OSTEOARTHRITIS Mother     Diabetes Mother     Heart Disease Mother     Hypertension Mother     Lung Disease Mother         COPD    Heart Attack Father 48        he was a smoker    Heart Disease Father     Lung Disease Father         COPD    Breast Cancer Maternal Aunt 79    Breast Cancer Maternal Grandmother 64    Cancer Maternal Grandmother     Heart Disease Maternal Grandmother     Hypertension Maternal Grandmother     Cancer Maternal Aunt      Social History:   She  reports that she has quit smoking. Her smoking use included cigarettes. She started smoking about 39 years ago. She has a 35.00 pack-year smoking history. She has never used smokeless tobacco.   Social History     Substance and Sexual Activity   Alcohol Use Yes    Comment: once in a while     Immunization History:  Immunization History   Administered Date(s) Administered    COVID-19, MODERNA BLUE border, Primary or Immunocompromised, (age 18y+), IM, 100 mcg/0.5mL 02/24/2021, 03/24/2021    COVID-19, MODERNA Booster BLUE border, (age 18y+), IM, 50mcg/0.25mL 11/23/2021    Influenza Vaccine 10/28/2013    Influenza Vaccine (Quad) Mdck Pf (>2 Yrs Flucelvax QUAD [id number]) 10/27/2021    Influenza Vaccine (Quad) PF (>6 Mo Flulaval, Fluarix, and >3 Yrs Afluria, Fluzone [id number]) 11/15/2016, 08/30/2017, 09/11/2018, 11/06/2019, 11/04/2020    Influenza Vaccine Split 10/26/2012    Influenza Vaccine Whole 10/29/2010    TD Vaccine 11/03/2006    Tdap 02/28/2017    Zoster Recombinant 10/27/2021       Todays concerns/HPI:    Menopausal sx: taking OTC Estroven which is effective for her symptoms.      Chronic neck pain: Diagnosed 2012 with cervical disc bulge and stenosis. She takes flexeril 10mg as needed 2-3x/w depending on her muscle stiffness. She also takes meloxicam daily.  She experience numbness and tingling in her Rt hand at times; depending on activity.      HTN: taking HCTZ as prescribed and tolerating well.        Review of Systems:  Pertinent items are noted in HPI. Review of Data:  n/a    Physical:   Visit Vitals  /63   Pulse 65   Temp 97.8 °F (36.6 °C) (Temporal)   Resp 18   Ht 5' 4\" (1.626 m)   Wt 153 lb 12.8 oz (69.8 kg)   SpO2 98%   BMI 26.40 kg/m²      Wt Readings from Last 3 Encounters:   07/18/22 153 lb 12.8 oz (69.8 kg)   02/09/22 156 lb (70.8 kg)   01/25/22 157 lb 12.8 oz (71.6 kg)       Exam:   Physical Exam  Constitutional:       Appearance: Normal appearance. HENT:      Head: Normocephalic and atraumatic. Right Ear: External ear normal.      Left Ear: External ear normal.   Eyes:      Extraocular Movements: Extraocular movements intact. Conjunctiva/sclera: Conjunctivae normal.   Neck:      Vascular: No carotid bruit. Cardiovascular:      Rate and Rhythm: Normal rate and regular rhythm. Heart sounds: Normal heart sounds. Pulmonary:      Effort: Pulmonary effort is normal. No respiratory distress. Breath sounds: Normal breath sounds. No wheezing. Musculoskeletal:         General: Normal range of motion. Cervical back: Normal range of motion and neck supple. Right lower leg: No edema. Left lower leg: No edema. Comments: No limitations noted   Skin:     General: Skin is warm and dry. Neurological:      General: No focal deficit present. Mental Status: She is alert and oriented to person, place, and time. Psychiatric:         Mood and Affect: Mood normal.         Behavior: Behavior normal.        Body mass index is 26.4 kg/m². Plan:    1. Primary hypertension    - lisinopril-hydroCHLOROthiazide (PRINZIDE, ZESTORETIC) 20-25 mg per tablet; Take 1 Tablet by mouth daily. For blood pressure  Dispense: 90 Tablet; Refill: 0    2. Foraminal stenosis of cervical region    - DULoxetine (CYMBALTA) 30 mg capsule;  Take 1 Capsule by mouth daily. Dispense: 90 Capsule; Refill: 1    3. Cervical radiculopathy    - meloxicam (MOBIC) 15 mg tablet; Take 1 Tablet by mouth daily as needed for Pain. Dispense: 90 Tablet; Refill: 1    4. Long term current use of non-steroidal anti-inflammatories (NSAID)  Have cautioned patient on the use of NSAIDs and the need to watch for elevations in BP, ankle edema, SOB or wheezing, blood-tinged vomiting, blood in stool or allergic reaction. Take Meloxicam as needed. Reviewed medication and completed medication reconciliation with the patient. Reviewed side effects of medications with the patient. Questions were answered and patient verb understanding.       Follow up 6 months CPE with labs (lipid, CMP, CBC)  Order LDCT for lung nodules for Feb 2023      Dr. Leobardo Mayfield, AGNP-C, DNP  Internists of [facility]       (Gualberto Meyer)

## 2022-07-18 NOTE — PROGRESS NOTES
Chief Complaint   Patient presents with    Hypertension     6 month f/u    Labs     1. \"Have you been to the ER, urgent care clinic since your last visit? Hospitalized since your last visit? \" No    2. \"Have you seen or consulted any other health care providers outside of the [address] [place] [address] since your last visit? \" No     3. For patients aged 39-70: Has the patient had a colonoscopy / FIT/ Cologuard? Yes - no Care Gap present      If the patient is female:    4. For patients aged 41-77: Has the patient had a mammogram within the past 2 years? Yes - no Care Gap present      5. For patients aged 21-65: Has the patient had a pap smear?  Yes - no Care Gap present

## 2022-07-25 ENCOUNTER — PATIENT MESSAGE (OUTPATIENT)
Dept: INTERNAL MEDICINE CLINIC | Age: 57
End: 2022-07-25

## 2022-07-25 RX ORDER — VALACYCLOVIR HYDROCHLORIDE 1 G/1
1000 TABLET, FILM COATED ORAL 2 TIMES DAILY
Qty: 6 TABLET | Refills: 0 | Status: SHIPPED | OUTPATIENT
Start: 2022-07-25

## 2022-07-25 NOTE — TELEPHONE ENCOUNTER
From: Myrlene Lazar Pipkin  To: Beryle Area, DNP  Sent: 7/25/2022 9:25 AM EDT  Subject: Valtrex Prescription     Good Morning Dr. Juan Manuel Quintero,  I had called on Saturday and spoke with the doctor on call as I needed to get the Valtrex script . I am having an outbreak and need a prescription refill . [facility] I already have this in my chart as a script so I lived with it and put cold compresses and used anti itch cream. It is still aggrevated red itchy and I can tell it's not overwith yet.  I can see a new blister Please help

## 2022-09-04 DIAGNOSIS — M48.02 FORAMINAL STENOSIS OF CERVICAL REGION: ICD-10-CM

## 2022-09-05 RX ORDER — DULOXETIN HYDROCHLORIDE 30 MG/1
CAPSULE, DELAYED RELEASE ORAL
Qty: 90 CAPSULE | Refills: 1 | OUTPATIENT
Start: 2022-09-05

## 2022-09-05 NOTE — TELEPHONE ENCOUNTER
90 d/s +1 refill 7/18/22.     Requested Prescriptions     Refused Prescriptions Disp Refills    DULoxetine (CYMBALTA) 30 mg capsule [Pharmacy Med Name: DULOXETINE HCL DR 30 MG CAP] 90 Capsule 1     Sig: TAKE 1 CAPSULE BY MOUTH EVERY DAY     Refused By: Monica Johnston     Reason for Refusal: Patient has requested refill too soon

## 2022-10-06 DIAGNOSIS — I10 PRIMARY HYPERTENSION: ICD-10-CM

## 2022-10-06 RX ORDER — LISINOPRIL AND HYDROCHLOROTHIAZIDE 20; 25 MG/1; MG/1
TABLET ORAL
Qty: 90 TABLET | Refills: 0 | OUTPATIENT
Start: 2022-10-06

## 2022-10-06 NOTE — TELEPHONE ENCOUNTER
Sent in 90 d/s 10/1/2022.      Requested Prescriptions     Refused Prescriptions Disp Refills    lisinopril-hydroCHLOROthiazide (PRINZIDE, ZESTORETIC) 20-25 mg per tablet [Pharmacy Med Name: LISINOPRIL-HCTZ 20-25 MG TAB] 90 Tablet 0     Sig: TAKE 1 TABLET BY MOUTH EVERY DAY FOR BLOOD PRESSURE     Refused By: Caitlin Loyd     Reason for Refusal: Refill not appropriate

## 2022-12-12 DIAGNOSIS — Z79.1 LONG TERM CURRENT USE OF NON-STEROIDAL ANTI-INFLAMMATORIES (NSAID): ICD-10-CM

## 2022-12-13 RX ORDER — OMEPRAZOLE 20 MG/1
CAPSULE, DELAYED RELEASE ORAL
Qty: 90 CAPSULE | Refills: 1 | Status: SHIPPED | OUTPATIENT
Start: 2022-12-13

## 2023-01-04 ENCOUNTER — HOSPITAL ENCOUNTER (OUTPATIENT)
Dept: LAB | Age: 58
Discharge: HOME | End: 2023-01-04
Payer: COMMERCIAL

## 2023-01-04 ENCOUNTER — APPOINTMENT (OUTPATIENT)
Dept: INTERNAL MEDICINE CLINIC | Age: 58
End: 2023-01-04

## 2023-01-04 ENCOUNTER — PATIENT MESSAGE (OUTPATIENT)
Dept: INTERNAL MEDICINE CLINIC | Age: 58
End: 2023-01-04

## 2023-01-04 DIAGNOSIS — E78.1 HYPERTRIGLYCERIDEMIA: ICD-10-CM

## 2023-01-04 DIAGNOSIS — I10 PRIMARY HYPERTENSION: ICD-10-CM

## 2023-01-04 DIAGNOSIS — I10 PRIMARY HYPERTENSION: Primary | ICD-10-CM

## 2023-01-04 LAB
ALBUMIN SERPL-MCNC: 4.1 G/DL (ref 3.4–5)
ALBUMIN/GLOB SERPL: 1.6 {RATIO} (ref 0.8–1.7)
ALP SERPL-CCNC: 65 U/L (ref 45–117)
ALT SERPL-CCNC: 24 U/L (ref 13–56)
ANION GAP SERPL CALC-SCNC: 6 MMOL/L (ref 3–18)
AST SERPL-CCNC: 15 U/L (ref 10–38)
BASOPHILS # BLD: 0 K/UL (ref 0–0.1)
BASOPHILS NFR BLD: 0 % (ref 0–2)
BILIRUB SERPL-MCNC: 0.3 MG/DL (ref 0.2–1)
BUN SERPL-MCNC: 17 MG/DL (ref 7–18)
BUN/CREAT SERPL: 20 (ref 12–20)
CALCIUM SERPL-MCNC: 9.3 MG/DL (ref 8.5–10.1)
CHLORIDE SERPL-SCNC: 102 MMOL/L (ref 100–111)
CHOLEST SERPL-MCNC: 169 MG/DL
CO2 SERPL-SCNC: 32 MMOL/L (ref 21–32)
CREAT SERPL-MCNC: 0.84 MG/DL (ref 0.6–1.3)
DIFFERENTIAL METHOD BLD: NORMAL
EOSINOPHIL # BLD: 0.2 K/UL (ref 0–0.4)
EOSINOPHIL NFR BLD: 3 % (ref 0–5)
ERYTHROCYTE [DISTWIDTH] IN BLOOD BY AUTOMATED COUNT: 13.6 % (ref 11.6–14.5)
GLOBULIN SER CALC-MCNC: 2.6 G/DL (ref 2–4)
GLUCOSE SERPL-MCNC: 81 MG/DL (ref 74–99)
HCT VFR BLD AUTO: 41 % (ref 35–45)
HDLC SERPL-MCNC: 59 MG/DL (ref 40–60)
HDLC SERPL: 2.9 {RATIO} (ref 0–5)
HGB BLD-MCNC: 12.7 G/DL (ref 12–16)
IMM GRANULOCYTES # BLD AUTO: 0 K/UL (ref 0–0.04)
IMM GRANULOCYTES NFR BLD AUTO: 0 % (ref 0–0.5)
LDLC SERPL CALC-MCNC: 86.4 MG/DL (ref 0–100)
LIPID PROFILE,FLP: NORMAL
LYMPHOCYTES # BLD: 1.8 K/UL (ref 0.9–3.6)
LYMPHOCYTES NFR BLD: 27 % (ref 21–52)
MCH RBC QN AUTO: 29.7 PG (ref 24–34)
MCHC RBC AUTO-ENTMCNC: 31 G/DL (ref 31–37)
MCV RBC AUTO: 96 FL (ref 78–100)
MONOCYTES # BLD: 0.4 K/UL (ref 0.05–1.2)
MONOCYTES NFR BLD: 6 % (ref 3–10)
NEUTS SEG # BLD: 4.2 K/UL (ref 1.8–8)
NEUTS SEG NFR BLD: 63 % (ref 40–73)
NRBC # BLD: 0 K/UL (ref 0–0.01)
NRBC BLD-RTO: 0 PER 100 WBC
PLATELET # BLD AUTO: 257 K/UL (ref 135–420)
PMV BLD AUTO: 11.1 FL (ref 9.2–11.8)
POTASSIUM SERPL-SCNC: 4 MMOL/L (ref 3.5–5.5)
PROT SERPL-MCNC: 6.7 G/DL (ref 6.4–8.2)
RBC # BLD AUTO: 4.27 M/UL (ref 4.2–5.3)
SODIUM SERPL-SCNC: 140 MMOL/L (ref 136–145)
TRIGL SERPL-MCNC: 118 MG/DL (ref ?–150)
VLDLC SERPL CALC-MCNC: 23.6 MG/DL
WBC # BLD AUTO: 6.6 K/UL (ref 4.6–13.2)

## 2023-01-04 PROCEDURE — 80061 LIPID PANEL: CPT

## 2023-01-04 PROCEDURE — 80053 COMPREHEN METABOLIC PANEL: CPT

## 2023-01-04 PROCEDURE — 85025 COMPLETE CBC W/AUTO DIFF WBC: CPT

## 2023-01-04 PROCEDURE — 36415 COLL VENOUS BLD VENIPUNCTURE: CPT

## 2023-01-09 ENCOUNTER — OFFICE VISIT (OUTPATIENT)
Dept: INTERNAL MEDICINE CLINIC | Age: 58
End: 2023-01-09
Payer: COMMERCIAL

## 2023-01-09 VITALS
RESPIRATION RATE: 18 BRPM | BODY MASS INDEX: 25.99 KG/M2 | OXYGEN SATURATION: 99 % | SYSTOLIC BLOOD PRESSURE: 138 MMHG | HEART RATE: 74 BPM | HEIGHT: 64 IN | WEIGHT: 152.2 LBS | DIASTOLIC BLOOD PRESSURE: 88 MMHG | TEMPERATURE: 96.9 F

## 2023-01-09 DIAGNOSIS — Z00.00 ANNUAL PHYSICAL EXAM: Primary | ICD-10-CM

## 2023-01-09 DIAGNOSIS — Z12.31 BREAST CANCER SCREENING BY MAMMOGRAM: ICD-10-CM

## 2023-01-09 DIAGNOSIS — G89.29 CHRONIC RIGHT SHOULDER PAIN: ICD-10-CM

## 2023-01-09 DIAGNOSIS — Z79.1 LONG TERM CURRENT USE OF NON-STEROIDAL ANTI-INFLAMMATORIES (NSAID): ICD-10-CM

## 2023-01-09 DIAGNOSIS — I10 PRIMARY HYPERTENSION: ICD-10-CM

## 2023-01-09 DIAGNOSIS — M25.511 CHRONIC RIGHT SHOULDER PAIN: ICD-10-CM

## 2023-01-09 DIAGNOSIS — M48.02 FORAMINAL STENOSIS OF CERVICAL REGION: ICD-10-CM

## 2023-01-09 DIAGNOSIS — E78.1 HYPERTRIGLYCERIDEMIA: ICD-10-CM

## 2023-01-09 DIAGNOSIS — M54.12 CERVICAL RADICULOPATHY: ICD-10-CM

## 2023-01-09 PROCEDURE — 3078F DIAST BP <80 MM HG: CPT | Performed by: NURSE PRACTITIONER

## 2023-01-09 PROCEDURE — 99396 PREV VISIT EST AGE 40-64: CPT | Performed by: NURSE PRACTITIONER

## 2023-01-09 PROCEDURE — 3075F SYST BP GE 130 - 139MM HG: CPT | Performed by: NURSE PRACTITIONER

## 2023-01-09 RX ORDER — MELOXICAM 15 MG/1
15 TABLET ORAL
Qty: 90 TABLET | Refills: 1 | Status: SHIPPED | OUTPATIENT
Start: 2023-01-09

## 2023-01-09 RX ORDER — DULOXETIN HYDROCHLORIDE 30 MG/1
30 CAPSULE, DELAYED RELEASE ORAL DAILY
Qty: 90 CAPSULE | Refills: 1 | Status: SHIPPED | OUTPATIENT
Start: 2023-01-09

## 2023-01-09 RX ORDER — LISINOPRIL AND HYDROCHLOROTHIAZIDE 20; 25 MG/1; MG/1
1 TABLET ORAL DAILY
Qty: 90 TABLET | Refills: 1 | Status: SHIPPED | OUTPATIENT
Start: 2023-01-09

## 2023-01-09 NOTE — PROGRESS NOTES
[facility]    1/9/2023    Griselda Bade Pipkin 1965 is a pleasant WHITE/NON- female    Reason for Visit:    Chief Complaint   Patient presents with    Physical    Labs     Completed 1/4/2023       Todays concern/HPI:  HTN. Tra prinzide well. Muscle spasms. Lessened freq. Flexeril as needed. Chronic Rt shoulder/neck. Meloxicam effective. Family hx of breast. Mother and aunt. Suspicious finding 2014-negative biopsy. Immunization. Rec'd flu and Covid #4 10/2022. Rec'd 1 shingles. Mother informed her she never had chicken pox. Not planning on getting 2nd vax. No complaints or concerns. Denies CP, SOB, GI/ issues, dizziness, fatigue.     Patient Active Problem List   Diagnosis Code    Allergic rhinitis due to allergen J30.9    Primary hypertension I10    Cervical radiculopathy M54.12    Long term current use of non-steroidal anti-inflammatories (NSAID) Z79.1    Lipoma of back D17.1    Light smoker F17.210    Foraminal stenosis of cervical region M48.02    Hypertriglyceridemia E78.1    Multiple lung nodules on CT R91.8    Chronic right shoulder pain M25.511, G89.29    Annual physical exam Z00.00       Past Medical History:   Diagnosis Date    HSV infection     Type 2    Hypertension     Hypertriglyceridemia 1/26/2022    IBS (irritable bowel syndrome)     Lateral epicondylitis of right elbow 4/22/2016    Nephrolithiasis 9/11/2018       Past Surgical History:   Procedure Laterality Date    HX BREAST BIOPSY Left 1/2/2015    LEFT NIPPLE DUCT EXPLRATION WITH EXCISIONAL BIOPSY performed by Perla Lamb MD at [facility] MAIN OR    HX BREAST BIOPSY Right 1/22/2016    WIDE LOCAL EXCISION WITH NEEDLE LOCALIZATION MAMMOGRAM RIGHT BREAST LESION performed by Perla Lamb MD at [address]    HX COLONOSCOPY  10/03/2016    Normal tissue, 10 years    KS EXC BREAST LES PREOP PLMT RAD MARKER OPEN 1 LES Right 1-22-16    Dr. Claribel Jasmine       Family History   Problem Relation Age of Onset    Cancer Mother 62        breast    Breast Cancer Mother 79    OSTEOARTHRITIS Mother     Diabetes Mother     Heart Disease Mother     Hypertension Mother     Lung Disease Mother         COPD    Heart Attack Father 48        he was a smoker    Heart Disease Father     Lung Disease Father         COPD    Breast Cancer Maternal Aunt 79    Breast Cancer Maternal Grandmother 64    Cancer Maternal Grandmother     Heart Disease Maternal Grandmother     Hypertension Maternal Grandmother     Cancer Maternal Aunt        Immunization History   Administered Date(s) Administered    COVID-19, MODERNA BLUE border, Primary or Immunocompromised, (age 18y+), IM, 100 mcg/0.5mL 02/24/2021, 03/24/2021    COVID-19, MODERNA Booster BLUE border, (age 18y+), IM, 50mcg/0.25mL 11/23/2021    Influenza Vaccine 10/28/2013    Influenza Vaccine Split 10/26/2012    Influenza Vaccine Whole 10/29/2010    Influenza, FLUARIX, FLULAVAL, FLUZONE (age 10 mo+) AND AFLURIA, (age 1 y+), PF, 0.5mL 11/15/2016, 08/30/2017, 09/11/2018, 11/06/2019, 11/04/2020    Influenza, FLUCELVAX, (age 10 mo+), MDCK, PF 10/27/2021    TD Vaccine 11/03/2006    Tdap 02/28/2017    Zoster Recombinant 10/27/2021       Allergies   Allergen Reactions    Clindamycin Other (comments)     Bowel infection. Pcn [Penicillins] Unknown (comments)    Tramadol Nausea Only         Current Outpatient Medications:     lisinopril-hydroCHLOROthiazide (PRINZIDE, ZESTORETIC) 20-25 mg per tablet, Take 1 Tablet by mouth daily. For blood pressure, Disp: 90 Tablet, Rfl: 1    DULoxetine (CYMBALTA) 30 mg capsule, Take 1 Capsule by mouth daily. , Disp: 90 Capsule, Rfl: 1    meloxicam (MOBIC) 15 mg tablet, Take 1 Tablet by mouth daily as needed for Pain., Disp: 90 Tablet, Rfl: 1    omeprazole (PRILOSEC) 20 mg capsule, TAKE 1 CAPSULE DAILY AS    NEEDED FOR REFLUX, Disp: 90 Capsule, Rfl: 1    valACYclovir (Valtrex) 1 gram tablet, Take 1 Tablet by mouth two (2) times a day., Disp: 6 Tablet, Rfl: 0    cyclobenzaprine (FLEXERIL) 10 mg tablet, TAKE 1 TABLET 3 TIMES DAILYAS NEEDED FOR MUSCLE SPASM, Disp: 30 Tablet, Rfl: 3    cholecalciferol, vitamin D3, (VITAMIN D3) 2,000 unit tab, 1 tablet by mouth daily, Disp: 100 Tab, Rfl: prn      Review of System:  History obtained from the patient  General ROS: negative  Psychological ROS: negative  Ophthalmic ROS: will schedule; questionable cataract  Breast ROS: negative for breast lumps  Biopsy in 2014 Rt breast neg  Respiratory ROS: no cough, shortness of breath, or wheezing. Believes lisinopril may cause mild cough but not an issue  Cardiovascular ROS: no chest pain or dyspnea on exertion. Develops mild swelling in ankles during the day, subsides at night. Gastrointestinal ROS: no abdominal pain, change in bowel habits, or black or bloody stools  Genito-Urinary ROS: no dysuria, trouble voiding, or hematuria  Musculoskeletal ROS: chronic Rt shoulder and neck pain. Continues cymbalta and mobic. Not requiring flexeril much.   Neurological ROS: no TIA or stroke symptoms  Dermatological ROS: negative    Visit Vitals  /88   Pulse 74   Temp 96.9 °F (36.1 °C) (Temporal)   Resp 18   Ht 5' 4\" (1.626 m)   Wt 152 lb 3.2 oz (69 kg)   SpO2 99%   BMI 26.13 kg/m²       Lab Review:  -118; LDL 86  CMP and CBC good    Physical Examination:  General appearance - alert, well appearing, and in no distress  Mental status - alert, oriented to person, place, and time  Eyes - pupils equal and reactive, extraocular eye movements intact  Ears - bilateral TM's and external ear canals normal  Nose - normal and patent, no erythema, discharge or polyps  Mouth - mucous membranes moist, pharynx normal without lesions  Chest - clear to auscultation, no wheezes, rales or rhonchi, symmetric air entry  Heart - normal rate, regular rhythm, normal S1, S2, no murmurs, rubs, clicks or gallops  Abdomen - soft, nontender, nondistended, no masses or organomegaly  Neurological - alert, oriented, normal speech, no focal findings or movement disorder noted  Musculoskeletal - no joint tenderness, deformity or swelling  Extremities - peripheral pulses normal, no pedal edema, no clubbing or cyanosis  Skin - normal coloration and turgor, no rashes, no suspicious skin lesions noted    Assessment:  1. Annual physical exam  2. Primary hypertension  Assessment & Plan:   well controlled, continue current medications   ACE may be causing mild cough. Pt wishes to cont Prinzide therapy  Limit sodium in diet to 2g/d  Avoid pork  Initiate cardio exercise  Weight reduction  Increase water intake    Orders:  -     lisinopril-hydroCHLOROthiazide (PRINZIDE, ZESTORETIC) 20-25 mg per tablet; Take 1 Tablet by mouth daily. For blood pressure, Normal, Disp-90 Tablet, R-1  3. Hypertriglyceridemia  Assessment & Plan:  -118; LDL 86  Much improved  Reduce fried fatty or oily foods in diet  Limit red meats, processed foods, and alcohol. Limit fast food  Work on weight reduction  Increase water intake  4. Foraminal stenosis of cervical region  Assessment & Plan:   well controlled, continue current medications   Continue cymbalta therapy  Orders:  -     DULoxetine (CYMBALTA) 30 mg capsule; Take 1 Capsule by mouth daily. , Normal, Disp-90 Capsule, R-1  5. Cervical radiculopathy  Assessment & Plan:   well controlled, continue current medications   Continue Cymbalta   Orders:  -     meloxicam (MOBIC) 15 mg tablet; Take 1 Tablet by mouth daily as needed for Pain., Normal, Disp-90 Tablet, R-1  6. Chronic right shoulder pain  Assessment & Plan:   well controlled, continue current medications   Continue mobic therapy  7. Long term current use of non-steroidal anti-inflammatories (NSAID)  Assessment & Plan:  Have cautioned patient on the use of NSAIDs and the need to watch for elevations in BP, ankle edema, SOB or wheezing, blood-tinged vomiting, blood in stool or allergic reaction.  If these symptoms occur, patient is to stop the medication immediately and contact the office. 8. Breast cancer screening by mammogram  -     [facility] 3D ROLAND W MAMMO BI SCREENING INCL CAD; Future      Plan:  1. Continue current meds as prescribed. Follow up:    2. Colonoscopy:  due 2026  3. Mammogram: ordered today  4. Immunizations: due 2nd shingles  5. Bone density: n/a  6. Pap Smear: 2024  7. Eye exam. Will schedule    Follow-up and Dispositions    Return in 6 months (on 7/9/2023) for Hypertension.            Shawn Segal, DNP

## 2023-01-09 NOTE — ASSESSMENT & PLAN NOTE
-118; LDL 86  Much improved  Reduce fried fatty or oily foods in diet  Limit red meats, processed foods, and alcohol.     Limit fast food  Work on weight reduction  Increase water intake

## 2023-01-09 NOTE — PROGRESS NOTES
Chief Complaint   Patient presents with    Physical    Labs     Completed 1/4/2023     1. \"Have you been to the ER, urgent care clinic since your last visit? Hospitalized since your last visit? \" No    2. \"Have you seen or consulted any other health care providers outside of the [address] [place] [address] since your last visit? \" No     3. For patients aged 39-70: Has the patient had a colonoscopy / FIT/ Cologuard? Yes - Care Gap present. Most recent result on file      If the patient is female:    4. For patients aged 41-77: Has the patient had a mammogram within the past 2 years? Yes - no Care Gap present      5. For patients aged 21-65: Has the patient had a pap smear? Yes - Care Gap present.  Most recent result on file

## 2023-01-09 NOTE — ASSESSMENT & PLAN NOTE
well controlled, continue current medications   ACE may be causing mild cough.  Pt wishes to cont Prinzide therapy  Limit sodium in diet to 2g/d  Avoid pork  Initiate cardio exercise  Weight reduction  Increase water intake

## 2023-01-09 NOTE — ASSESSMENT & PLAN NOTE
Have cautioned patient on the use of NSAIDs and the need to watch for elevations in BP, ankle edema, SOB or wheezing, blood-tinged vomiting, blood in stool or allergic reaction. If these symptoms occur, patient is to stop the medication immediately and contact the office.

## 2023-02-08 PROBLEM — Z00.00 ANNUAL PHYSICAL EXAM: Status: RESOLVED | Noted: 2023-01-09 | Resolved: 2023-02-08

## 2023-06-14 RX ORDER — OMEPRAZOLE 20 MG/1
CAPSULE, DELAYED RELEASE ORAL
Qty: 90 CAPSULE | Refills: 1 | OUTPATIENT
Start: 2023-06-14

## 2023-06-14 NOTE — TELEPHONE ENCOUNTER
Please refill if appropriate or refuse medication if not appropriate.     PCP: Lacy Monterroso DNP    Last appt: 1/9/2023    Last refill: 12/13/2022    Next Appt: 7/6/2023          Requested Prescriptions     Pending Prescriptions Disp Refills    omeprazole (PRILOSEC) 20 MG delayed release capsule [Pharmacy Med Name: OMEPRAZOL RX CAP 20MG] 90 capsule 1     Sig: TAKE 1 CAPSULE DAILY AS    NEEDED FOR REFLUX

## 2023-07-05 SDOH — ECONOMIC STABILITY: HOUSING INSECURITY
IN THE LAST 12 MONTHS, WAS THERE A TIME WHEN YOU DID NOT HAVE A STEADY PLACE TO SLEEP OR SLEPT IN A SHELTER (INCLUDING NOW)?: NO

## 2023-07-05 SDOH — ECONOMIC STABILITY: INCOME INSECURITY: HOW HARD IS IT FOR YOU TO PAY FOR THE VERY BASICS LIKE FOOD, HOUSING, MEDICAL CARE, AND HEATING?: NOT HARD AT ALL

## 2023-07-05 SDOH — ECONOMIC STABILITY: FOOD INSECURITY: WITHIN THE PAST 12 MONTHS, THE FOOD YOU BOUGHT JUST DIDN'T LAST AND YOU DIDN'T HAVE MONEY TO GET MORE.: NEVER TRUE

## 2023-07-05 SDOH — ECONOMIC STABILITY: FOOD INSECURITY: WITHIN THE PAST 12 MONTHS, YOU WORRIED THAT YOUR FOOD WOULD RUN OUT BEFORE YOU GOT MONEY TO BUY MORE.: NEVER TRUE

## 2023-07-05 SDOH — ECONOMIC STABILITY: TRANSPORTATION INSECURITY
IN THE PAST 12 MONTHS, HAS LACK OF TRANSPORTATION KEPT YOU FROM MEETINGS, WORK, OR FROM GETTING THINGS NEEDED FOR DAILY LIVING?: NO

## 2023-07-06 ENCOUNTER — OFFICE VISIT (OUTPATIENT)
Age: 58
End: 2023-07-06
Payer: COMMERCIAL

## 2023-07-06 VITALS
SYSTOLIC BLOOD PRESSURE: 135 MMHG | BODY MASS INDEX: 25.68 KG/M2 | OXYGEN SATURATION: 99 % | RESPIRATION RATE: 16 BRPM | DIASTOLIC BLOOD PRESSURE: 74 MMHG | WEIGHT: 150.4 LBS | TEMPERATURE: 98.4 F | HEIGHT: 64 IN | HEART RATE: 75 BPM

## 2023-07-06 DIAGNOSIS — M54.12 CERVICAL RADICULOPATHY: ICD-10-CM

## 2023-07-06 DIAGNOSIS — Z12.31 BREAST CANCER SCREENING BY MAMMOGRAM: ICD-10-CM

## 2023-07-06 DIAGNOSIS — K21.9 GASTROESOPHAGEAL REFLUX DISEASE WITHOUT ESOPHAGITIS: ICD-10-CM

## 2023-07-06 DIAGNOSIS — M48.02 FORAMINAL STENOSIS OF CERVICAL REGION: ICD-10-CM

## 2023-07-06 DIAGNOSIS — I10 PRIMARY HYPERTENSION: Primary | ICD-10-CM

## 2023-07-06 PROCEDURE — 3074F SYST BP LT 130 MM HG: CPT | Performed by: NURSE PRACTITIONER

## 2023-07-06 PROCEDURE — 99213 OFFICE O/P EST LOW 20 MIN: CPT | Performed by: NURSE PRACTITIONER

## 2023-07-06 PROCEDURE — 3078F DIAST BP <80 MM HG: CPT | Performed by: NURSE PRACTITIONER

## 2023-07-06 RX ORDER — DULOXETIN HYDROCHLORIDE 30 MG/1
30 CAPSULE, DELAYED RELEASE ORAL DAILY
Qty: 90 CAPSULE | Refills: 1 | Status: SHIPPED | OUTPATIENT
Start: 2023-07-06

## 2023-07-06 RX ORDER — LISINOPRIL AND HYDROCHLOROTHIAZIDE 25; 20 MG/1; MG/1
1 TABLET ORAL DAILY
Qty: 90 TABLET | Refills: 1 | Status: SHIPPED | OUTPATIENT
Start: 2023-07-06

## 2023-07-06 RX ORDER — OMEPRAZOLE 20 MG/1
CAPSULE, DELAYED RELEASE ORAL
Qty: 90 CAPSULE | Refills: 1 | Status: SHIPPED | OUTPATIENT
Start: 2023-07-06

## 2023-07-06 ASSESSMENT — PATIENT HEALTH QUESTIONNAIRE - PHQ9
SUM OF ALL RESPONSES TO PHQ QUESTIONS 1-9: 0
SUM OF ALL RESPONSES TO PHQ9 QUESTIONS 1 & 2: 0
SUM OF ALL RESPONSES TO PHQ QUESTIONS 1-9: 0
1. LITTLE INTEREST OR PLEASURE IN DOING THINGS: 0
SUM OF ALL RESPONSES TO PHQ QUESTIONS 1-9: 0
SUM OF ALL RESPONSES TO PHQ QUESTIONS 1-9: 0
2. FEELING DOWN, DEPRESSED OR HOPELESS: 0

## 2023-07-18 DIAGNOSIS — I10 PRIMARY HYPERTENSION: ICD-10-CM

## 2023-07-18 RX ORDER — LISINOPRIL AND HYDROCHLOROTHIAZIDE 25; 20 MG/1; MG/1
TABLET ORAL
Qty: 90 TABLET | Refills: 1 | OUTPATIENT
Start: 2023-07-18

## 2023-07-19 ENCOUNTER — TELEPHONE (OUTPATIENT)
Age: 58
End: 2023-07-19

## 2023-07-19 NOTE — TELEPHONE ENCOUNTER
----- [facility] [place] [facility] Pipkin sent at 7/19/2023  7:50 AM EDT -----  Regarding: Poison Ivy Topical Prescription   Contact: [phone]  Good Morning  Dionisio Duran,   I got into some poison ivy while trimming bushes at my Dads. Is there a prescription strength lotion you can provide as it is all over my forearms been taking benadryl at night and anti-itch cream but would like a prescription if possible.    Thanks so much it's driving me crazy

## 2023-07-26 DIAGNOSIS — M54.12 RADICULOPATHY, CERVICAL REGION: ICD-10-CM

## 2023-07-26 RX ORDER — MELOXICAM 15 MG/1
TABLET ORAL
Qty: 90 TABLET | Refills: 1 | Status: SHIPPED | OUTPATIENT
Start: 2023-07-26

## 2023-07-26 NOTE — TELEPHONE ENCOUNTER
PCP: Misael Marquez DNP    Previous refill per chart   Disp Refills Start End    meloxicam (MOBIC) 15 MG tablet   7/18/2022     Sig - Route:  Take 15 mg by mouth daily as needed - Oral    Class: Historical Med          Future Appointments   Date Time Provider 4600  46[facility]   8/8/2023  3:00 PM [facility] RM 1 2D [facility]   1/11/2024  2:45 PM Misael Marquez DNP IOC BS AMB

## 2023-08-08 ENCOUNTER — HOSPITAL ENCOUNTER (OUTPATIENT)
Facility: HOSPITAL | Age: 58
Discharge: HOME | End: 2023-08-11
Payer: COMMERCIAL

## 2023-08-08 DIAGNOSIS — Z12.31 BREAST CANCER SCREENING BY MAMMOGRAM: ICD-10-CM

## 2023-08-08 PROCEDURE — 77063 BREAST TOMOSYNTHESIS BI: CPT

## 2023-09-18 ENCOUNTER — TELEMEDICINE (OUTPATIENT)
Age: 58
End: 2023-09-18
Payer: COMMERCIAL

## 2023-09-18 DIAGNOSIS — U07.1 POSITIVE SELF-ADMINISTERED ANTIGEN TEST FOR COVID-19: Primary | ICD-10-CM

## 2023-09-18 PROCEDURE — 99213 OFFICE O/P EST LOW 20 MIN: CPT | Performed by: NURSE PRACTITIONER

## 2023-09-18 NOTE — PROGRESS NOTES
\  Chief Complaint   Patient presents with    Cough     Covid symptoms with 2 positive at home tests. 1. \"Have you been to the ER, urgent care clinic since your last visit? Hospitalized since your last visit? \" No    2. \"Have you seen or consulted any other health care providers outside of the [address] [place] [address] since your last visit? \" No     3. For patients aged 43-73: Has the patient had a colonoscopy / FIT/ Cologuard? Yes - no Care Gap present      If the patient is female:    4. For patients aged 43-66: Has the patient had a mammogram within the past 2 years? Yes - no Care Gap present      5. For patients aged 21-65: Has the patient had a pap smear?  Yes - no Care Gap present

## 2023-09-18 NOTE — PROGRESS NOTES
[facility] of [address] [place] [address] [address]  [phone] [facility]/[phone] fax    9/18/2023    HPI:   Lorne Dada Pipkin 1965 is a pleasant White (non-) female who presents virtually for sick appt. Todays concern/HPI:  Went on a cruise. Sx started  9/15/23. Sinus infec sx-upper teeth hurting, sinus pressure, yellowish color from nose. No fever. Sx improving. +Covid x2 9/17/23 and 9/18/23. Review of Systems - Negative except above      Past Medical History:   Diagnosis Date    HSV infection     Type 2    Hypertension     Hypertriglyceridemia 1/26/2022    IBS (irritable bowel syndrome)     Lateral epicondylitis of right elbow 4/22/2016    Nephrolithiasis 9/11/2018     Past Surgical History:   Procedure Laterality Date    BREAST BIOPSY Left 1/2/2015    LEFT NIPPLE DUCT EXPLRATION WITH EXCISIONAL BIOPSY performed by Serge Matthews MD at [address] [facility] Right 1/22/2016    WIDE LOCAL EXCISION WITH NEEDLE LOCALIZATION MAMMOGRAM RIGHT BREAST LESION performed by Serge Matthews MD at [address] [place] [address]    COLONOSCOPY  10/03/2016    Normal tissue, 10 years    EXCISE BREAST LES W XRAY MARKER Right 1-22-16    Dr. Joanna Soni     Current Outpatient Medications   Medication Sig    nirmatrelvir/ritonavir 300/100 (PAXLOVID) 20 x 150 MG & 10 x 100MG TBPK Take 3 tablets by mouth in the morning and 3 tablets in the evening. If on a statin, hold med during treatment period. Possible side effects of metallic taste  and diarrhea. If on anticoag therapy-pt not a candidate. .    meloxicam (MOBIC) 15 MG tablet TAKE 1 TABLET BY MOUTH EVERY DAY AS NEEDED FOR PAIN    lisinopril-hydroCHLOROthiazide (PRINZIDE;ZESTORETIC) 20-25 MG per tablet Take 1 tablet by mouth daily    DULoxetine (CYMBALTA) 30 MG extended release capsule Take 1 capsule by mouth daily    omeprazole (PRILOSEC) 20 MG delayed release capsule TAKE 1 CAPSULE DAILY AS    NEEDED FOR REFLUX    valACYclovir

## 2023-12-13 DIAGNOSIS — K21.9 GASTROESOPHAGEAL REFLUX DISEASE WITHOUT ESOPHAGITIS: ICD-10-CM

## 2023-12-13 DIAGNOSIS — M48.02 FORAMINAL STENOSIS OF CERVICAL REGION: ICD-10-CM

## 2023-12-13 DIAGNOSIS — I10 PRIMARY HYPERTENSION: ICD-10-CM

## 2023-12-14 RX ORDER — OMEPRAZOLE 20 MG/1
CAPSULE, DELAYED RELEASE ORAL
Qty: 90 CAPSULE | Refills: 1 | OUTPATIENT
Start: 2023-12-14

## 2023-12-14 RX ORDER — LISINOPRIL AND HYDROCHLOROTHIAZIDE 25; 20 MG/1; MG/1
1 TABLET ORAL DAILY
Qty: 90 TABLET | Refills: 1 | OUTPATIENT
Start: 2023-12-14

## 2023-12-14 RX ORDER — DULOXETIN HYDROCHLORIDE 30 MG/1
30 CAPSULE, DELAYED RELEASE ORAL DAILY
Qty: 90 CAPSULE | Refills: 1 | OUTPATIENT
Start: 2023-12-14

## 2024-01-10 ENCOUNTER — PATIENT MESSAGE (OUTPATIENT)
Age: 59
End: 2024-01-10

## 2024-01-10 DIAGNOSIS — I10 PRIMARY HYPERTENSION: Primary | ICD-10-CM

## 2024-01-10 DIAGNOSIS — E78.1 HYPERTRIGLYCERIDEMIA: ICD-10-CM

## 2024-01-10 DIAGNOSIS — Z00.00 LABORATORY TESTS ORDERED AS PART OF A COMPLETE PHYSICAL EXAM (CPE): ICD-10-CM

## 2024-01-10 NOTE — TELEPHONE ENCOUNTER
Labs are in.      Diagnosis Orders   1. Primary hypertension  Basic Metabolic Panel      2. Hypertriglyceridemia  Lipid Panel      3. Laboratory tests ordered as part of a complete physical exam (CPE)  Lipid Panel    Basic Metabolic Panel

## 2024-01-10 NOTE — TELEPHONE ENCOUNTER
From: Samantha Lee Pipkin  To: Tammy Cook  Sent: 1/10/2024 6:32 AM EST  Subject: Lab orders    Good Morning Tammy,  I just wanted to check and see of the lab orders were in so I can get them done Monday I am going to go to [facility] as they open early.  Thanks and have a great day

## 2024-01-17 ENCOUNTER — HOSPITAL ENCOUNTER (OUTPATIENT)
Facility: HOSPITAL | Age: 59
Discharge: HOME | End: 2024-01-20
Payer: COMMERCIAL

## 2024-01-17 DIAGNOSIS — E78.1 HYPERTRIGLYCERIDEMIA: ICD-10-CM

## 2024-01-17 DIAGNOSIS — Z00.00 LABORATORY TESTS ORDERED AS PART OF A COMPLETE PHYSICAL EXAM (CPE): ICD-10-CM

## 2024-01-17 DIAGNOSIS — I10 PRIMARY HYPERTENSION: ICD-10-CM

## 2024-01-17 LAB
ANION GAP SERPL CALC-SCNC: 1 MMOL/L (ref 3–18)
BUN SERPL-MCNC: 13 MG/DL (ref 7–18)
BUN/CREAT SERPL: 14 (ref 12–20)
CALCIUM SERPL-MCNC: 9.5 MG/DL (ref 8.5–10.1)
CHLORIDE SERPL-SCNC: 104 MMOL/L (ref 100–111)
CHOLEST SERPL-MCNC: 174 MG/DL
CO2 SERPL-SCNC: 34 MMOL/L (ref 21–32)
CREAT SERPL-MCNC: 0.93 MG/DL (ref 0.6–1.3)
GLUCOSE SERPL-MCNC: 90 MG/DL (ref 74–99)
HDLC SERPL-MCNC: 58 MG/DL (ref 40–60)
HDLC SERPL: 3 (ref 0–5)
LDLC SERPL CALC-MCNC: 85.4 MG/DL (ref 0–100)
LIPID PANEL: ABNORMAL
POTASSIUM SERPL-SCNC: 4.2 MMOL/L (ref 3.5–5.5)
SODIUM SERPL-SCNC: 139 MMOL/L (ref 136–145)
TRIGL SERPL-MCNC: 153 MG/DL
VLDLC SERPL CALC-MCNC: 30.6 MG/DL

## 2024-01-17 PROCEDURE — 80048 BASIC METABOLIC PNL TOTAL CA: CPT

## 2024-01-17 PROCEDURE — 36415 COLL VENOUS BLD VENIPUNCTURE: CPT

## 2024-01-17 PROCEDURE — 80061 LIPID PANEL: CPT

## 2024-01-29 ENCOUNTER — OFFICE VISIT (OUTPATIENT)
Age: 59
End: 2024-01-29
Payer: COMMERCIAL

## 2024-01-29 ENCOUNTER — HOSPITAL ENCOUNTER (OUTPATIENT)
Facility: HOSPITAL | Age: 59
Discharge: HOME | End: 2024-02-01
Payer: COMMERCIAL

## 2024-01-29 VITALS
HEIGHT: 64 IN | RESPIRATION RATE: 16 BRPM | DIASTOLIC BLOOD PRESSURE: 72 MMHG | OXYGEN SATURATION: 100 % | WEIGHT: 155 LBS | BODY MASS INDEX: 26.46 KG/M2 | TEMPERATURE: 98.1 F | SYSTOLIC BLOOD PRESSURE: 112 MMHG | HEART RATE: 52 BPM

## 2024-01-29 DIAGNOSIS — I10 PRIMARY HYPERTENSION: ICD-10-CM

## 2024-01-29 DIAGNOSIS — Z12.4 CERVICAL CANCER SCREENING: ICD-10-CM

## 2024-01-29 DIAGNOSIS — M48.02 FORAMINAL STENOSIS OF CERVICAL REGION: ICD-10-CM

## 2024-01-29 DIAGNOSIS — M54.12 CERVICAL RADICULOPATHY: ICD-10-CM

## 2024-01-29 DIAGNOSIS — Z79.1 LONG TERM CURRENT USE OF NON-STEROIDAL ANTI-INFLAMMATORIES (NSAID): ICD-10-CM

## 2024-01-29 DIAGNOSIS — G89.29 CHRONIC RIGHT-SIDED LOW BACK PAIN WITHOUT SCIATICA: ICD-10-CM

## 2024-01-29 DIAGNOSIS — M54.50 CHRONIC RIGHT-SIDED LOW BACK PAIN WITHOUT SCIATICA: ICD-10-CM

## 2024-01-29 DIAGNOSIS — R91.8 MULTIPLE LUNG NODULES ON CT: ICD-10-CM

## 2024-01-29 DIAGNOSIS — E78.1 HYPERTRIGLYCERIDEMIA: ICD-10-CM

## 2024-01-29 DIAGNOSIS — Z87.891 PERSONAL HISTORY OF TOBACCO USE: ICD-10-CM

## 2024-01-29 DIAGNOSIS — Z00.00 ANNUAL PHYSICAL EXAM: Primary | ICD-10-CM

## 2024-01-29 DIAGNOSIS — M54.12 RADICULOPATHY, CERVICAL REGION: ICD-10-CM

## 2024-01-29 PROBLEM — M25.511 CHRONIC RIGHT SHOULDER PAIN: Status: RESOLVED | Noted: 2023-01-09 | Resolved: 2024-01-29

## 2024-01-29 PROBLEM — D17.1 LIPOMA OF BACK: Status: RESOLVED | Noted: 2021-07-19 | Resolved: 2024-01-29

## 2024-01-29 PROBLEM — U07.1 POSITIVE SELF-ADMINISTERED ANTIGEN TEST FOR COVID-19: Status: RESOLVED | Noted: 2023-09-18 | Resolved: 2024-01-29

## 2024-01-29 PROCEDURE — 99396 PREV VISIT EST AGE 40-64: CPT | Performed by: NURSE PRACTITIONER

## 2024-01-29 PROCEDURE — 3078F DIAST BP <80 MM HG: CPT | Performed by: NURSE PRACTITIONER

## 2024-01-29 PROCEDURE — 3074F SYST BP LT 130 MM HG: CPT | Performed by: NURSE PRACTITIONER

## 2024-01-29 PROCEDURE — G0296 VISIT TO DETERM LDCT ELIG: HCPCS | Performed by: NURSE PRACTITIONER

## 2024-01-29 PROCEDURE — 72100 X-RAY EXAM L-S SPINE 2/3 VWS: CPT

## 2024-01-29 RX ORDER — LISINOPRIL AND HYDROCHLOROTHIAZIDE 25; 20 MG/1; MG/1
1 TABLET ORAL DAILY
Qty: 95 TABLET | Refills: 1 | Status: SHIPPED | OUTPATIENT
Start: 2024-01-29

## 2024-01-29 RX ORDER — MELOXICAM 15 MG/1
15 TABLET ORAL DAILY PRN
Qty: 95 TABLET | Refills: 3 | Status: SHIPPED | OUTPATIENT
Start: 2024-01-29

## 2024-01-29 RX ORDER — OMEPRAZOLE 20 MG/1
CAPSULE, DELAYED RELEASE ORAL
Qty: 95 CAPSULE | Refills: 3 | Status: SHIPPED | OUTPATIENT
Start: 2024-01-29

## 2024-01-29 RX ORDER — DULOXETIN HYDROCHLORIDE 30 MG/1
30 CAPSULE, DELAYED RELEASE ORAL DAILY
Qty: 95 CAPSULE | Refills: 3 | Status: SHIPPED | OUTPATIENT
Start: 2024-01-29

## 2024-01-29 RX ORDER — LISINOPRIL AND HYDROCHLOROTHIAZIDE 25; 20 MG/1; MG/1
1 TABLET ORAL DAILY
Qty: 95 TABLET | Refills: 1 | Status: CANCELLED | OUTPATIENT
Start: 2024-01-29

## 2024-01-29 ASSESSMENT — PATIENT HEALTH QUESTIONNAIRE - PHQ9
1. LITTLE INTEREST OR PLEASURE IN DOING THINGS: 0
2. FEELING DOWN, DEPRESSED OR HOPELESS: 0
SUM OF ALL RESPONSES TO PHQ QUESTIONS 1-9: 0
SUM OF ALL RESPONSES TO PHQ QUESTIONS 1-9: 0
SUM OF ALL RESPONSES TO PHQ9 QUESTIONS 1 & 2: 0
SUM OF ALL RESPONSES TO PHQ QUESTIONS 1-9: 0
SUM OF ALL RESPONSES TO PHQ QUESTIONS 1-9: 0

## 2024-01-29 NOTE — ASSESSMENT & PLAN NOTE
Lab Reviewed with Patient:  -153; LDL 85    Health Maintenance:  Colonoscopy:  2026  Mammogram: 8/2024  Bone density: n/a  Pap Smear: 9/2024  Eye exam: pt to schedule  LDCT: order placed    Specialists:  [facility]

## 2024-01-29 NOTE — ASSESSMENT & PLAN NOTE
Have cautioned patient on the use of NSAID therapy.  Strongly rec only taking NSAID as needed  Eat prior to taking nsaid to help reduce GI irritation  Cont PPI prior to taking NSAID for gut protection    Long term use could lead to elevated BP, ankle edema, blood-tinged vomiting, blood in stool or allergic reaction. If these symptoms occur, patient is to stop the medication immediately and contact the office.

## 2024-01-29 NOTE — PROGRESS NOTES
Plan:  Lab Reviewed with Patient:  -153; LDL 85    Health Maintenance:  Colonoscopy:  2026  Mammogram: 8/2024  Bone density: n/a  Pap Smear: 9/2024  Eye exam: pt to schedule  LDCT: order placed    Specialists:  [facility]  2. Cervical cancer screening  -     External Referral To Gynecology  3. Personal history of tobacco use  Assessment & Plan:  1ppd 37 years    Discussed with the patient the current USPSTF guidelines released March 9, 2021 for screening for lung cancer.    For adults aged 50 to 80 years who have a 20 pack-year smoking history and currently smoke or have quit within the past 15 years the grade B recommendation is to:  Screen for lung cancer with low-dose computed tomography (LDCT) every year.  Stop screening once a person has not smoked for 15 years or has a health problem that limits life expectancy or the ability to have lung surgery.    The patient  reports that she has been smoking cigarettes. She started smoking about 40 years ago. She has a 10.2 pack-year smoking history. She has never used smokeless tobacco.. Discussed with patient the risks and benefits of screening, including over-diagnosis, false positive rate, and total radiation exposure.  The patient currently exhibits no signs or symptoms suggestive of lung cancer.  Discussed with patient the importance of compliance with yearly annual lung cancer screenings and willingness to undergo diagnosis and treatment if screening scan is positive.  In addition, the patient was counseled regarding the importance of remaining smoke free and/or total smoking cessation.    Also reviewed the following if the patient has Medicare that as of February 10, 2022, Medicare only covers LDCT screening in patients aged 50-77 with at least a 20 pack-year smoking history who currently smoke or have quit in the last 15 years  Orders:  -     [place] VISIT TO DISCUSS LUNG CA SCREEN W LDCT  -     CT Lung Screen (Initial/Annual/Baseline); Future  4. Primary

## 2024-01-29 NOTE — ASSESSMENT & PLAN NOTE
Well-controlled, continue current medications  Limit sodium in diet to 2g/d  Initiate cardio exercise  Weight reduction  Increase water intake  Check BP and report if 3 consecutive readings greater than 139/89 to office

## 2024-01-29 NOTE — ASSESSMENT & PLAN NOTE
-153; LDL 85  Bernard level 12m    Reduce fried fatty or oily foods in diet  Limit red meats, processed foods, and alcohol.    Limit fast food  Increase physical activity to 60 minutes of moderate intensity aerobic exercise per week.  Increase water intake  Reduce alcohol intake    How to raise HDL if low:  Consume oats, beans, legumes, olive oil, whole grains, nuts, seeds, fatty fish, and berries.  Lose weight/fat  Reduce alcohol consumption  Smoking cessation

## 2024-01-29 NOTE — ASSESSMENT & PLAN NOTE
Had a fall from a ladder back in Aug 2023 (distance of fall unknown)  Was prescribed Mobic in Sept by Dr Munoz  This therapy effective  Refilled med  Pt seeking xray

## 2024-01-29 NOTE — ASSESSMENT & PLAN NOTE
1ppd 38 years    Discussed with the patient the current USPSTF guidelines released March 9, 2021 for screening for lung cancer.    For adults aged 50 to 80 years who have a 20 pack-year smoking history and currently smoke or have quit within the past 15 years the grade B recommendation is to:  Screen for lung cancer with low-dose computed tomography (LDCT) every year.  Stop screening once a person has not smoked for 15 years or has a health problem that limits life expectancy or the ability to have lung surgery.    The patient  reports that she has been smoking cigarettes. She started smoking about 40 years ago. She has a 10.2 pack-year smoking history. She has never used smokeless tobacco.. Discussed with patient the risks and benefits of screening, including over-diagnosis, false positive rate, and total radiation exposure.  The patient currently exhibits no signs or symptoms suggestive of lung cancer.  Discussed with patient the importance of compliance with yearly annual lung cancer screenings and willingness to undergo diagnosis and treatment if screening scan is positive.  In addition, the patient was counseled regarding the importance of remaining smoke free and/or total smoking cessation.    Also reviewed the following if the patient has Medicare that as of February 10, 2022, Medicare only covers LDCT screening in patients aged 50-77 with at least a 20 pack-year smoking history who currently smoke or have quit in the last 15 years

## 2024-01-30 ENCOUNTER — TELEPHONE (OUTPATIENT)
Age: 59
End: 2024-01-30

## 2024-01-30 PROBLEM — M47.816 OSTEOARTHRITIS OF LUMBAR SPINE: Status: ACTIVE | Noted: 2024-01-30

## 2024-01-30 PROBLEM — M43.10 RETROLISTHESIS OF VERTEBRAE: Status: ACTIVE | Noted: 2024-01-30

## 2024-01-30 NOTE — TELEPHONE ENCOUNTER
----- Message from Tammy Cook DNP sent at 1/30/2024  9:37 AM EST -----  [facility] nurses, pls contact patient with the following: (Please do not send message via [employer], contact them via phone)    Xray shows mild arthritis. Stay active. Cont mobic/omeprazole prn.     Thank you

## 2024-01-30 NOTE — TELEPHONE ENCOUNTER
Spoke with patient she is aware of results and to continue mobic and omeprazole. Advised if it gets worse to reach out to me

## 2024-01-30 NOTE — RESULT ENCOUNTER NOTE
[facility] nurses, pls contact patient with the following: (Please do not send message via [employer], contact them via phone)    Xray shows mild arthritis. Stay active. Cont mobic/omeprazole prn.     Thank you

## 2024-01-31 DIAGNOSIS — M47.816 SPONDYLOSIS OF LUMBAR REGION WITHOUT MYELOPATHY OR RADICULOPATHY: ICD-10-CM

## 2024-01-31 DIAGNOSIS — M43.10 RETROLISTHESIS: Primary | ICD-10-CM

## 2024-01-31 NOTE — PROGRESS NOTES
You may benefit from phy therapy. I have placed the order for you. Cont the Mobic.      Diagnosis Orders   1. Retrolisthesis  [facility] [place] [facility] [place]      2. Spondylosis of lumbar region without myelopathy or radiculopathy  [facility] [place] [facility] [place]

## 2024-02-27 ENCOUNTER — HOSPITAL ENCOUNTER (OUTPATIENT)
Facility: HOSPITAL | Age: 59
Discharge: HOME | End: 2024-03-01
Payer: COMMERCIAL

## 2024-02-27 DIAGNOSIS — Z87.891 PERSONAL HISTORY OF TOBACCO USE: ICD-10-CM

## 2024-02-27 PROCEDURE — 71271 CT THORAX LUNG CANCER SCR C-: CPT

## 2024-02-28 ENCOUNTER — TELEPHONE (OUTPATIENT)
Age: 59
End: 2024-02-28

## 2024-02-28 PROBLEM — Z00.00 ANNUAL PHYSICAL EXAM: Status: RESOLVED | Noted: 2023-01-09 | Resolved: 2024-02-28

## 2024-02-28 NOTE — TELEPHONE ENCOUNTER
----- Message from Tammy Cook DNP sent at 2/28/2024  9:57 AM EST -----  [facility] nurses, pls contact patient with the following: (Please do not send message via [employer], contact them via phone)    LDCT-tiny nodules stable. No new nodules noted.  Continue annual screening.    Thank you

## 2024-06-27 ENCOUNTER — OFFICE VISIT (OUTPATIENT)
Facility: CLINIC | Age: 59
End: 2024-06-27
Payer: COMMERCIAL

## 2024-06-27 VITALS
HEIGHT: 64 IN | BODY MASS INDEX: 27.14 KG/M2 | DIASTOLIC BLOOD PRESSURE: 82 MMHG | WEIGHT: 159 LBS | TEMPERATURE: 97.9 F | SYSTOLIC BLOOD PRESSURE: 148 MMHG

## 2024-06-27 DIAGNOSIS — I10 PRIMARY HYPERTENSION: Primary | ICD-10-CM

## 2024-06-27 DIAGNOSIS — I10 PRIMARY HYPERTENSION: ICD-10-CM

## 2024-06-27 DIAGNOSIS — H81.10 BENIGN PAROXYSMAL POSITIONAL VERTIGO, UNSPECIFIED LATERALITY: ICD-10-CM

## 2024-06-27 PROCEDURE — 99214 OFFICE O/P EST MOD 30 MIN: CPT | Performed by: NURSE PRACTITIONER

## 2024-06-27 PROCEDURE — 3077F SYST BP >= 140 MM HG: CPT | Performed by: NURSE PRACTITIONER

## 2024-06-27 PROCEDURE — 3079F DIAST BP 80-89 MM HG: CPT | Performed by: NURSE PRACTITIONER

## 2024-06-27 RX ORDER — AMLODIPINE BESYLATE 5 MG/1
5 TABLET ORAL NIGHTLY
Qty: 30 TABLET | Refills: 0 | Status: SHIPPED | OUTPATIENT
Start: 2024-06-27

## 2024-06-27 RX ORDER — LISINOPRIL AND HYDROCHLOROTHIAZIDE 25; 20 MG/1; MG/1
1 TABLET ORAL DAILY
Qty: 90 TABLET | Refills: 0 | Status: SHIPPED | OUTPATIENT
Start: 2024-06-27

## 2024-06-27 NOTE — PROGRESS NOTES
patient (or guardian, if applicable) and other individuals in attendance with the patient were advised that Artificial Intelligence will be utilized during this visit to record and process the conversation to generate a clinical note. The patient (or guardian, if applicable) and other individuals in attendance at the appointment consented to the use of AI, including the recording.

## 2024-06-27 NOTE — ASSESSMENT & PLAN NOTE
Patient was seen in the ED on 6/26/2024 for dizziness and elevated blood pressure  Unsure if elevated blood pressure is causing the vertigo or if patient develop vertigo which is causing her blood pressure to rise.    Due to patient's blood pressure being uncontrolled today at 148/82 despite taking Prinzide 20/25, will initiate low-dose amlodipine 5 mg to take in the evenings.  Patient states her blood pressures are much higher in the mornings so initiate amlodipine at night will hopefully help to lower her blood pressures in a.m.    Patient to monitor her blood pressure daily 2 hours after taking medication and record results.  Patient to schedule a virtual in 3 weeks with her BP results available for review.  If BP is well-managed with the addition of amlodipine 5 mg daily, will send in a refill to hold her until her appointment in September.    Patient to place meloxicam on hold for now as this may cause increase in blood pressure.

## 2024-06-27 NOTE — ASSESSMENT & PLAN NOTE
New diagnosis from ED on 6/26/2024  Encourage patient to utilize meclizine as needed  Patient to stand slowly to help limit or omit dizzy spells  Stay well-hydrated  Avoid driving when dizzy

## 2024-06-28 RX ORDER — LISINOPRIL AND HYDROCHLOROTHIAZIDE 20; 25 MG/1; MG/1
1 TABLET ORAL DAILY
Qty: 90 TABLET | Refills: 1 | OUTPATIENT
Start: 2024-06-28

## 2024-07-03 ENCOUNTER — TELEPHONE (OUTPATIENT)
Facility: CLINIC | Age: 59
End: 2024-07-03

## 2024-07-03 NOTE — TELEPHONE ENCOUNTER
Reviewed chart and she was evaluated at [facility] ED on 6/26/2024 for dizziness and diagnosed with benign positional vertigo.  She was also noted to have elevated blood pressure and when seen by [facility] on 6/27/2024 in follow-up, amlodipine was added.  Please advise her to try meclizine for her dizziness since may not be experiencing \"spinning\" with vertigo as cause.  Also have her monitor her blood pressure with the addition of amlodipine to make sure that her blood pressure is not low.  Return to urgent care if not improving.

## 2024-07-03 NOTE — TELEPHONE ENCOUNTER
----- Message from Samantha L. Pipkin sent at 7/3/2024  9:02 AM EDT -----  Regarding: Dizziness   Contact: [phone]  Good Morning,   I know we have a follow-up appointment on the 17th, but I was wondering if it's normal for me to still have dizziness, not Vertigo, but just feeling dizzy?   And how long should I expect that to last?  Should I see an ENT

## 2024-07-18 ENCOUNTER — TELEMEDICINE (OUTPATIENT)
Facility: CLINIC | Age: 59
End: 2024-07-18
Payer: COMMERCIAL

## 2024-07-18 DIAGNOSIS — H81.10 BENIGN PAROXYSMAL POSITIONAL VERTIGO, UNSPECIFIED LATERALITY: ICD-10-CM

## 2024-07-18 DIAGNOSIS — I10 PRIMARY HYPERTENSION: Primary | ICD-10-CM

## 2024-07-18 PROCEDURE — 99213 OFFICE O/P EST LOW 20 MIN: CPT | Performed by: NURSE PRACTITIONER

## 2024-07-18 RX ORDER — AMLODIPINE BESYLATE 5 MG/1
5 TABLET ORAL NIGHTLY
Qty: 90 TABLET | Refills: 0 | Status: SHIPPED | OUTPATIENT
Start: 2024-07-18

## 2024-07-18 SDOH — ECONOMIC STABILITY: FOOD INSECURITY: WITHIN THE PAST 12 MONTHS, THE FOOD YOU BOUGHT JUST DIDN'T LAST AND YOU DIDN'T HAVE MONEY TO GET MORE.: NEVER TRUE

## 2024-07-18 SDOH — ECONOMIC STABILITY: FOOD INSECURITY: WITHIN THE PAST 12 MONTHS, YOU WORRIED THAT YOUR FOOD WOULD RUN OUT BEFORE YOU GOT MONEY TO BUY MORE.: NEVER TRUE

## 2024-07-18 SDOH — ECONOMIC STABILITY: INCOME INSECURITY: HOW HARD IS IT FOR YOU TO PAY FOR THE VERY BASICS LIKE FOOD, HOUSING, MEDICAL CARE, AND HEATING?: NOT HARD AT ALL

## 2024-07-18 NOTE — PROGRESS NOTES
Samantha Lee Pipkin presents today for   Chief Complaint   Patient presents with    Hypertension     3 week follow up            \"Have you been to the ER, urgent care clinic since your last visit?  Hospitalized since your last visit?\"    NO    “Have you seen or consulted any other health care providers outside of [facility] since your last visit?”    NO             
Virus Vaccine 10/29/2010, 10/26/2012, 10/28/2013    Influenza, FLUARIX, FLULAVAL, FLUZONE (age 6 mo+) AND AFLURIA, (age 3 y+), PF, 0.5mL 11/15/2016, 08/30/2017, 09/11/2018, 11/06/2019, 11/04/2020    Influenza, FLUCELVAX, (age 6 mo+), MDCK, PF, 0.5mL 10/27/2021    TDaP, ADACEL (age 10y-64y), BOOSTRIX (age 10y+), IM, 0.5mL 02/28/2017    Td vaccine (adult) 11/03/2006    Zoster Recombinant (Shingrix) 10/27/2021         No follow-ups on file.    Location:  Provider-in office  Patient: Home    Dr. Tammy Cook, NARAP-C, DNP  [facility]     The patient (or guardian, if applicable) and other individuals in attendance with the patient were advised that Artificial Intelligence will be utilized during this visit to record and process the conversation to generate a clinical note. The patient (or guardian, if applicable) and other individuals in attendance at the appointment consented to the use of AI, including the recording.

## 2024-07-18 NOTE — ASSESSMENT & PLAN NOTE
Symptoms have resolved.  Last episode 7/12/2024.  Has meclizine on hand as needed    6/27/24 ov note:  New diagnosis from ED on 6/26/2024  Encourage patient to utilize meclizine as needed  Patient to stand slowly to help limit or omit dizzy spells  Stay well-hydrated  Avoid driving when dizzy

## 2024-07-18 NOTE — ASSESSMENT & PLAN NOTE
BP appears well-controlled at this time  Continue Prinzide 20/20 5 in the AM  Continue amlodipine 5 mg in the p.m.  Check blood pressure 2 times a week and bring results to her appointment in September 6/27/2024 OV note:  Patient was seen in the ED on 6/26/2024 for dizziness and elevated blood pressure  Unsure if elevated blood pressure is causing the vertigo or if patient develop vertigo which is causing her blood pressure to rise.    Due to patient's blood pressure being uncontrolled today at 148/82 despite taking Prinzide 20/25, will initiate low-dose amlodipine 5 mg to take in the evenings.  Patient states her blood pressures are much higher in the mornings so initiate amlodipine at night will hopefully help to lower her blood pressures in a.m.    Patient to monitor her blood pressure daily 2 hours after taking medication and record results.  Patient to schedule a virtual in 3 weeks with her BP results available for review.  If BP is well-managed with the addition of amlodipine 5 mg daily, will send in a refill to hold her until her appointment in September.    Patient to place meloxicam on hold for now as this may cause increase in blood pressure.

## 2024-07-29 ENCOUNTER — TELEPHONE (OUTPATIENT)
Facility: CLINIC | Age: 59
End: 2024-07-29

## 2024-07-29 NOTE — TELEPHONE ENCOUNTER
----- Message from Samantha L. Pipkin sent at 7/29/2024 10:01 AM EDT -----  Regarding: Adding to bloodwork  Contact: [phone]  Good morning, Tammy. I was wondering if you could add vitamin. D and Tyriod  on my blood work this time

## 2024-07-30 NOTE — TELEPHONE ENCOUNTER
Please inquire as to why she is seeking a vitamin D and thyroid levels to be checked at her September appointment.  Is she having any symptoms?  Please explain to her I need a reason to check labs in order for her insurance to cover the cost.  Thank you

## 2024-09-04 ENCOUNTER — TELEPHONE (OUTPATIENT)
Facility: CLINIC | Age: 59
End: 2024-09-04

## 2024-09-04 DIAGNOSIS — E55.9 VITAMIN D DEFICIENCY: ICD-10-CM

## 2024-09-04 DIAGNOSIS — I10 PRIMARY HYPERTENSION: Primary | ICD-10-CM

## 2024-09-04 DIAGNOSIS — L65.9 HAIR THINNING: ICD-10-CM

## 2024-09-09 ENCOUNTER — HOSPITAL ENCOUNTER (OUTPATIENT)
Facility: HOSPITAL | Age: 59
Discharge: HOME | End: 2024-09-12
Payer: COMMERCIAL

## 2024-09-09 DIAGNOSIS — E55.9 VITAMIN D DEFICIENCY: ICD-10-CM

## 2024-09-09 DIAGNOSIS — I10 PRIMARY HYPERTENSION: ICD-10-CM

## 2024-09-09 DIAGNOSIS — L65.9 HAIR THINNING: ICD-10-CM

## 2024-09-09 LAB
25(OH)D3 SERPL-MCNC: 31.7 NG/ML (ref 30–100)
ANION GAP SERPL CALC-SCNC: 2 MMOL/L (ref 3–18)
BUN SERPL-MCNC: 17 MG/DL (ref 7–18)
BUN/CREAT SERPL: 17 (ref 12–20)
CALCIUM SERPL-MCNC: 9.5 MG/DL (ref 8.5–10.1)
CHLORIDE SERPL-SCNC: 106 MMOL/L (ref 100–111)
CO2 SERPL-SCNC: 31 MMOL/L (ref 21–32)
CREAT SERPL-MCNC: 1.02 MG/DL (ref 0.6–1.3)
GLUCOSE SERPL-MCNC: 104 MG/DL (ref 74–99)
POTASSIUM SERPL-SCNC: 4.4 MMOL/L (ref 3.5–5.5)
SODIUM SERPL-SCNC: 139 MMOL/L (ref 136–145)
TSH SERPL DL<=0.05 MIU/L-ACNC: 1.82 UIU/ML (ref 0.36–3.74)

## 2024-09-09 PROCEDURE — 82306 VITAMIN D 25 HYDROXY: CPT

## 2024-09-09 PROCEDURE — 84443 ASSAY THYROID STIM HORMONE: CPT

## 2024-09-09 PROCEDURE — 36415 COLL VENOUS BLD VENIPUNCTURE: CPT

## 2024-09-09 PROCEDURE — 80048 BASIC METABOLIC PNL TOTAL CA: CPT

## 2024-09-12 ENCOUNTER — OFFICE VISIT (OUTPATIENT)
Facility: CLINIC | Age: 59
End: 2024-09-12
Payer: COMMERCIAL

## 2024-09-12 VITALS
SYSTOLIC BLOOD PRESSURE: 120 MMHG | HEIGHT: 64 IN | OXYGEN SATURATION: 100 % | HEART RATE: 98 BPM | RESPIRATION RATE: 16 BRPM | DIASTOLIC BLOOD PRESSURE: 70 MMHG | BODY MASS INDEX: 26.98 KG/M2 | TEMPERATURE: 98.4 F | WEIGHT: 158 LBS

## 2024-09-12 DIAGNOSIS — Z12.31 ENCOUNTER FOR SCREENING MAMMOGRAM FOR MALIGNANT NEOPLASM OF BREAST: ICD-10-CM

## 2024-09-12 DIAGNOSIS — I10 PRIMARY HYPERTENSION: Primary | ICD-10-CM

## 2024-09-12 DIAGNOSIS — E55.9 VITAMIN D DEFICIENCY: ICD-10-CM

## 2024-09-12 DIAGNOSIS — E78.1 HYPERTRIGLYCERIDEMIA: ICD-10-CM

## 2024-09-12 PROCEDURE — 99214 OFFICE O/P EST MOD 30 MIN: CPT | Performed by: NURSE PRACTITIONER

## 2024-09-12 PROCEDURE — 3078F DIAST BP <80 MM HG: CPT | Performed by: NURSE PRACTITIONER

## 2024-09-12 PROCEDURE — 3074F SYST BP LT 130 MM HG: CPT | Performed by: NURSE PRACTITIONER

## 2024-09-12 RX ORDER — AMLODIPINE BESYLATE 5 MG/1
5 TABLET ORAL NIGHTLY
Qty: 90 TABLET | Refills: 1 | Status: SHIPPED | OUTPATIENT
Start: 2024-09-12

## 2024-09-12 RX ORDER — LISINOPRIL AND HYDROCHLOROTHIAZIDE 20; 25 MG/1; MG/1
1 TABLET ORAL DAILY
Qty: 90 TABLET | Refills: 1 | Status: SHIPPED | OUTPATIENT
Start: 2024-09-12

## 2024-09-12 RX ORDER — ERGOCALCIFEROL 1.25 MG/1
50000 CAPSULE, LIQUID FILLED ORAL WEEKLY
Qty: 12 CAPSULE | Refills: 1 | Status: SHIPPED | OUTPATIENT
Start: 2024-09-12

## 2025-01-11 DIAGNOSIS — M48.02 FORAMINAL STENOSIS OF CERVICAL REGION: ICD-10-CM

## 2025-01-11 DIAGNOSIS — M54.12 RADICULOPATHY, CERVICAL REGION: ICD-10-CM

## 2025-01-13 RX ORDER — MELOXICAM 15 MG/1
TABLET ORAL
Qty: 90 TABLET | Refills: 0 | Status: SHIPPED | OUTPATIENT
Start: 2025-01-13

## 2025-01-13 RX ORDER — DULOXETIN HYDROCHLORIDE 30 MG/1
30 CAPSULE, DELAYED RELEASE ORAL DAILY
Qty: 90 CAPSULE | Refills: 0 | Status: SHIPPED | OUTPATIENT
Start: 2025-01-13

## 2025-02-18 DIAGNOSIS — E55.9 VITAMIN D DEFICIENCY: ICD-10-CM

## 2025-02-18 NOTE — TELEPHONE ENCOUNTER
Last Office visit:  9/12/2024    Last Filled: 9/12/2024; Qty 12 w/ 1 refill     Follow up visit:    Future Appointments   Date Time Provider Department Center   3/17/2025  2:45 PM Tammy Cook DNP [facility]

## 2025-02-19 RX ORDER — ERGOCALCIFEROL 1.25 MG/1
CAPSULE, LIQUID FILLED ORAL
Qty: 12 CAPSULE | Refills: 1 | OUTPATIENT
Start: 2025-02-19

## 2025-04-03 DIAGNOSIS — M48.02 FORAMINAL STENOSIS OF CERVICAL REGION: ICD-10-CM

## 2025-04-03 DIAGNOSIS — I10 PRIMARY HYPERTENSION: ICD-10-CM

## 2025-04-03 DIAGNOSIS — M54.12 RADICULOPATHY, CERVICAL REGION: ICD-10-CM

## 2025-04-03 RX ORDER — MELOXICAM 15 MG/1
TABLET ORAL
Qty: 30 TABLET | Refills: 0 | Status: SHIPPED | OUTPATIENT
Start: 2025-04-03

## 2025-04-03 RX ORDER — OMEPRAZOLE 20 MG/1
CAPSULE, DELAYED RELEASE ORAL
Qty: 30 CAPSULE | Refills: 0 | Status: SHIPPED | OUTPATIENT
Start: 2025-04-03

## 2025-04-03 RX ORDER — AMLODIPINE BESYLATE 5 MG/1
5 TABLET ORAL NIGHTLY
Qty: 30 TABLET | Refills: 0 | Status: SHIPPED | OUTPATIENT
Start: 2025-04-03

## 2025-04-03 RX ORDER — DULOXETIN HYDROCHLORIDE 30 MG/1
30 CAPSULE, DELAYED RELEASE ORAL DAILY
Qty: 30 CAPSULE | Refills: 0 | Status: SHIPPED | OUTPATIENT
Start: 2025-04-03

## 2025-04-03 RX ORDER — LISINOPRIL AND HYDROCHLOROTHIAZIDE 20; 25 MG/1; MG/1
1 TABLET ORAL DAILY
Qty: 30 TABLET | Refills: 0 | Status: SHIPPED | OUTPATIENT
Start: 2025-04-03

## 2025-04-23 ENCOUNTER — HOSPITAL ENCOUNTER (OUTPATIENT)
Facility: HOSPITAL | Age: 60
Discharge: HOME | End: 2025-04-26
Payer: COMMERCIAL

## 2025-04-23 ENCOUNTER — TELEPHONE (OUTPATIENT)
Facility: CLINIC | Age: 60
End: 2025-04-23

## 2025-04-23 DIAGNOSIS — E55.9 VITAMIN D DEFICIENCY: ICD-10-CM

## 2025-04-23 DIAGNOSIS — I10 PRIMARY HYPERTENSION: ICD-10-CM

## 2025-04-23 DIAGNOSIS — E78.1 HYPERTRIGLYCERIDEMIA: ICD-10-CM

## 2025-04-23 DIAGNOSIS — I10 PRIMARY HYPERTENSION: Primary | ICD-10-CM

## 2025-04-23 LAB
25(OH)D3 SERPL-MCNC: 46.1 NG/ML (ref 30–100)
ALBUMIN SERPL-MCNC: 4.1 G/DL (ref 3.4–5)
ALBUMIN/GLOB SERPL: 1.5 (ref 0.8–1.7)
ALP SERPL-CCNC: 86 U/L (ref 45–117)
ALT SERPL-CCNC: 23 U/L (ref 13–56)
ANION GAP SERPL CALC-SCNC: 1 MMOL/L (ref 3–18)
AST SERPL-CCNC: 14 U/L (ref 10–38)
BASOPHILS # BLD: 0.04 K/UL (ref 0–0.1)
BASOPHILS NFR BLD: 0.6 % (ref 0–2)
BILIRUB SERPL-MCNC: 0.4 MG/DL (ref 0.2–1)
BUN SERPL-MCNC: 16 MG/DL (ref 7–18)
BUN/CREAT SERPL: 19 (ref 12–20)
CALCIUM SERPL-MCNC: 9.9 MG/DL (ref 8.5–10.1)
CHLORIDE SERPL-SCNC: 107 MMOL/L (ref 100–111)
CHOLEST SERPL-MCNC: 202 MG/DL
CO2 SERPL-SCNC: 33 MMOL/L (ref 21–32)
CREAT SERPL-MCNC: 0.83 MG/DL (ref 0.6–1.3)
DIFFERENTIAL METHOD BLD: NORMAL
EOSINOPHIL # BLD: 0.25 K/UL (ref 0–0.4)
EOSINOPHIL NFR BLD: 3.7 % (ref 0–5)
ERYTHROCYTE [DISTWIDTH] IN BLOOD BY AUTOMATED COUNT: 13.2 % (ref 11.6–14.5)
GLOBULIN SER CALC-MCNC: 2.8 G/DL (ref 2–4)
GLUCOSE SERPL-MCNC: 98 MG/DL (ref 74–99)
HCT VFR BLD AUTO: 42.1 % (ref 35–45)
HDLC SERPL-MCNC: 60 MG/DL (ref 40–60)
HDLC SERPL: 3.4 (ref 0–5)
HGB BLD-MCNC: 13.3 G/DL (ref 12–16)
IMM GRANULOCYTES # BLD AUTO: 0.02 K/UL (ref 0–0.04)
IMM GRANULOCYTES NFR BLD AUTO: 0.3 % (ref 0–0.5)
LDLC SERPL CALC-MCNC: 106.2 MG/DL (ref 0–100)
LIPID PANEL: ABNORMAL
LYMPHOCYTES # BLD: 1.98 K/UL (ref 0.9–3.6)
LYMPHOCYTES NFR BLD: 29.1 % (ref 21–52)
MCH RBC QN AUTO: 29.8 PG (ref 24–34)
MCHC RBC AUTO-ENTMCNC: 31.6 G/DL (ref 31–37)
MCV RBC AUTO: 94.4 FL (ref 78–100)
MONOCYTES # BLD: 0.51 K/UL (ref 0.05–1.2)
MONOCYTES NFR BLD: 7.5 % (ref 3–10)
NEUTS SEG # BLD: 4.01 K/UL (ref 1.8–8)
NEUTS SEG NFR BLD: 58.8 % (ref 40–73)
NRBC # BLD: 0 K/UL (ref 0–0.01)
NRBC BLD-RTO: 0 PER 100 WBC
PLATELET # BLD AUTO: 308 K/UL (ref 135–420)
PMV BLD AUTO: 10.7 FL (ref 9.2–11.8)
POTASSIUM SERPL-SCNC: 4.8 MMOL/L (ref 3.5–5.5)
PROT SERPL-MCNC: 6.9 G/DL (ref 6.4–8.2)
RBC # BLD AUTO: 4.46 M/UL (ref 4.2–5.3)
SODIUM SERPL-SCNC: 141 MMOL/L (ref 136–145)
TRIGL SERPL-MCNC: 179 MG/DL
VLDLC SERPL CALC-MCNC: 35.8 MG/DL
WBC # BLD AUTO: 6.8 K/UL (ref 4.6–13.2)

## 2025-04-23 PROCEDURE — 80061 LIPID PANEL: CPT

## 2025-04-23 PROCEDURE — 80053 COMPREHEN METABOLIC PANEL: CPT

## 2025-04-23 PROCEDURE — 36415 COLL VENOUS BLD VENIPUNCTURE: CPT

## 2025-04-23 PROCEDURE — 85025 COMPLETE CBC W/AUTO DIFF WBC: CPT

## 2025-04-23 PROCEDURE — 82306 VITAMIN D 25 HYDROXY: CPT

## 2025-04-26 DIAGNOSIS — M48.02 FORAMINAL STENOSIS OF CERVICAL REGION: ICD-10-CM

## 2025-04-26 DIAGNOSIS — I10 PRIMARY HYPERTENSION: ICD-10-CM

## 2025-04-27 SDOH — ECONOMIC STABILITY: INCOME INSECURITY: IN THE LAST 12 MONTHS, WAS THERE A TIME WHEN YOU WERE NOT ABLE TO PAY THE MORTGAGE OR RENT ON TIME?: NO

## 2025-04-27 SDOH — ECONOMIC STABILITY: FOOD INSECURITY: WITHIN THE PAST 12 MONTHS, YOU WORRIED THAT YOUR FOOD WOULD RUN OUT BEFORE YOU GOT MONEY TO BUY MORE.: NEVER TRUE

## 2025-04-27 SDOH — ECONOMIC STABILITY: FOOD INSECURITY: WITHIN THE PAST 12 MONTHS, THE FOOD YOU BOUGHT JUST DIDN'T LAST AND YOU DIDN'T HAVE MONEY TO GET MORE.: NEVER TRUE

## 2025-04-27 SDOH — ECONOMIC STABILITY: TRANSPORTATION INSECURITY
IN THE PAST 12 MONTHS, HAS THE LACK OF TRANSPORTATION KEPT YOU FROM MEDICAL APPOINTMENTS OR FROM GETTING MEDICATIONS?: NO

## 2025-04-28 RX ORDER — DULOXETIN HYDROCHLORIDE 30 MG/1
30 CAPSULE, DELAYED RELEASE ORAL DAILY
Qty: 30 CAPSULE | Refills: 0 | OUTPATIENT
Start: 2025-04-28

## 2025-04-28 RX ORDER — LISINOPRIL AND HYDROCHLOROTHIAZIDE 20; 25 MG/1; MG/1
1 TABLET ORAL DAILY
Qty: 30 TABLET | Refills: 0 | OUTPATIENT
Start: 2025-04-28

## 2025-04-30 ENCOUNTER — OFFICE VISIT (OUTPATIENT)
Facility: CLINIC | Age: 60
End: 2025-04-30
Payer: COMMERCIAL

## 2025-04-30 ENCOUNTER — RESULTS FOLLOW-UP (OUTPATIENT)
Facility: CLINIC | Age: 60
End: 2025-04-30

## 2025-04-30 VITALS
HEIGHT: 64 IN | DIASTOLIC BLOOD PRESSURE: 68 MMHG | RESPIRATION RATE: 14 BRPM | TEMPERATURE: 97.9 F | SYSTOLIC BLOOD PRESSURE: 122 MMHG | WEIGHT: 161 LBS | HEART RATE: 58 BPM | OXYGEN SATURATION: 99 % | BODY MASS INDEX: 27.49 KG/M2

## 2025-04-30 DIAGNOSIS — M54.50 CHRONIC RIGHT-SIDED LOW BACK PAIN WITHOUT SCIATICA: ICD-10-CM

## 2025-04-30 DIAGNOSIS — Z87.891 PERSONAL HISTORY OF TOBACCO USE: ICD-10-CM

## 2025-04-30 DIAGNOSIS — I10 PRIMARY HYPERTENSION: Primary | ICD-10-CM

## 2025-04-30 DIAGNOSIS — E55.9 VITAMIN D DEFICIENCY: ICD-10-CM

## 2025-04-30 DIAGNOSIS — F17.200 CURRENT SMOKER: ICD-10-CM

## 2025-04-30 DIAGNOSIS — Z23 IMMUNIZATION DUE: ICD-10-CM

## 2025-04-30 DIAGNOSIS — E78.1 HYPERTRIGLYCERIDEMIA: ICD-10-CM

## 2025-04-30 DIAGNOSIS — G89.29 CHRONIC RIGHT-SIDED LOW BACK PAIN WITHOUT SCIATICA: ICD-10-CM

## 2025-04-30 DIAGNOSIS — M48.02 FORAMINAL STENOSIS OF CERVICAL REGION: ICD-10-CM

## 2025-04-30 DIAGNOSIS — R91.8 MULTIPLE LUNG NODULES ON CT: ICD-10-CM

## 2025-04-30 DIAGNOSIS — M54.12 RADICULOPATHY, CERVICAL REGION: ICD-10-CM

## 2025-04-30 PROBLEM — M47.816 OSTEOARTHRITIS OF LUMBAR SPINE: Status: RESOLVED | Noted: 2024-01-30 | Resolved: 2025-04-30

## 2025-04-30 PROBLEM — F17.210 LIGHT SMOKER: Status: RESOLVED | Noted: 2022-01-25 | Resolved: 2025-04-30

## 2025-04-30 PROBLEM — H81.10 BENIGN PAROXYSMAL POSITIONAL VERTIGO: Status: RESOLVED | Noted: 2024-06-27 | Resolved: 2025-04-30

## 2025-04-30 PROCEDURE — G0296 VISIT TO DETERM LDCT ELIG: HCPCS | Performed by: NURSE PRACTITIONER

## 2025-04-30 PROCEDURE — 99214 OFFICE O/P EST MOD 30 MIN: CPT | Performed by: NURSE PRACTITIONER

## 2025-04-30 PROCEDURE — 90471 IMMUNIZATION ADMIN: CPT | Performed by: NURSE PRACTITIONER

## 2025-04-30 PROCEDURE — 3078F DIAST BP <80 MM HG: CPT | Performed by: NURSE PRACTITIONER

## 2025-04-30 PROCEDURE — 3074F SYST BP LT 130 MM HG: CPT | Performed by: NURSE PRACTITIONER

## 2025-04-30 PROCEDURE — 90677 PCV20 VACCINE IM: CPT | Performed by: NURSE PRACTITIONER

## 2025-04-30 RX ORDER — MELOXICAM 15 MG/1
15 TABLET ORAL DAILY
Qty: 90 TABLET | Refills: 1 | Status: SHIPPED | OUTPATIENT
Start: 2025-04-30

## 2025-04-30 RX ORDER — OMEPRAZOLE 20 MG/1
CAPSULE, DELAYED RELEASE ORAL
Qty: 90 CAPSULE | Refills: 1 | Status: SHIPPED | OUTPATIENT
Start: 2025-04-30

## 2025-04-30 RX ORDER — AMLODIPINE BESYLATE 5 MG/1
5 TABLET ORAL NIGHTLY
Qty: 90 TABLET | Refills: 1 | Status: SHIPPED | OUTPATIENT
Start: 2025-04-30

## 2025-04-30 RX ORDER — ERGOCALCIFEROL 1.25 MG/1
50000 CAPSULE, LIQUID FILLED ORAL WEEKLY
Qty: 12 CAPSULE | Refills: 3 | Status: SHIPPED | OUTPATIENT
Start: 2025-04-30

## 2025-04-30 RX ORDER — DULOXETIN HYDROCHLORIDE 30 MG/1
30 CAPSULE, DELAYED RELEASE ORAL DAILY
Qty: 90 CAPSULE | Refills: 1 | Status: SHIPPED | OUTPATIENT
Start: 2025-04-30

## 2025-04-30 RX ORDER — LISINOPRIL AND HYDROCHLOROTHIAZIDE 20; 25 MG/1; MG/1
1 TABLET ORAL DAILY
Qty: 90 TABLET | Refills: 1 | Status: SHIPPED | OUTPATIENT
Start: 2025-04-30

## 2025-04-30 ASSESSMENT — PATIENT HEALTH QUESTIONNAIRE - PHQ9
1. LITTLE INTEREST OR PLEASURE IN DOING THINGS: NOT AT ALL
SUM OF ALL RESPONSES TO PHQ QUESTIONS 1-9: 0
2. FEELING DOWN, DEPRESSED OR HOPELESS: NOT AT ALL
SUM OF ALL RESPONSES TO PHQ QUESTIONS 1-9: 0

## 2025-04-30 NOTE — ASSESSMENT & PLAN NOTE
BP well-controlled  Continue amlodipine 5 mg daily  Continue lisinopril/HCTZ 20/25 mg daily  Limit sodium in diet to 2g/d  Initiate cardio exercise  Weight reduction  Increase water intake  Check BP and report if 3 consecutive readings greater than 139/89 to office  CMP WNL    9/12/2024:  BP well-controlled with amlodipine 5 mg nightly and Prinzide 20/25 daily  Continue to monitor blood pressure and notify this office if /89 or higher    7/18/2024 OV note:  BP appears well-controlled at this time  Continue Prinzide 20/20 5 in the AM  Continue amlodipine 5 mg in the p.m.  Check blood pressure 2 times a week and bring results to her appointment in September 6/27/2024 OV note:  Patient was seen in the ED on 6/26/2024 for dizziness and elevated blood pressure  Unsure if elevated blood pressure is causing the vertigo or if patient develop vertigo which is causing her blood pressure to rise.    Due to patient's blood pressure being uncontrolled today at 148/82 despite taking Prinzide 20/25, will initiate low-dose amlodipine 5 mg to take in the evenings.  Patient states her blood pressures are much higher in the mornings so initiate amlodipine at night will hopefully help to lower her blood pressures in a.m.    Patient to monitor her blood pressure daily 2 hours after taking medication and record results.  Patient to schedule a virtual in 3 weeks with her BP results available for review.  If BP is well-managed with the addition of amlodipine 5 mg daily, will send in a refill to hold her until her appointment in September.    Patient to place meloxicam on hold for now as this may cause increase in blood pressure.

## 2025-04-30 NOTE — PROGRESS NOTES
Samantha Lee Pipkin is a 59 y.o. female (: 1965) presenting to address:    Chief Complaint   Patient presents with    6 Month Follow-Up       Vitals:    25 1134   BP: 122/68   Pulse: 58   Resp: 14   Temp: 97.9 °F (36.6 °C)   SpO2: 99%       \"Have you been to the ER, urgent care clinic since your last visit?  Hospitalized since your last visit?\"    NO    “Have you seen or consulted any other health care providers outside of [facility] since your last visit?”    NO       Have you had a mammogram?”   NO appt scheduled.    Date of last Mammogram: 2023      “Have you had a pap smear?”    NO    Date of last Cervical Cancer screen (HPV or PAP): 2019     Immunizations Administered       Name Date Dose Route    Pneumococcal, PCV20, PREVNAR 20, (age 6w+), IM, 0.5mL 2025 0.5 mL Intramuscular    Site: Deltoid- Left    Lot: [id number]    NDC: 2767-5725-66

## 2025-04-30 NOTE — ASSESSMENT & PLAN NOTE
Past history of smoking 1 pack/day  - Smoking since high school, currently smokes about half a pack a day.  - Smoking cessation strongly recommended due to increased risk of lung cancer, especially given family history.  - Wellbutrin mentioned as a potential aid for smoking cessation, although its effectiveness is uncertain.    - Placed order for LDCT

## 2025-04-30 NOTE — PROGRESS NOTES
[facility] [address]  [address]  [place] [id number]  [phone] office/[phone] fax    4/30/2025    Samantha Lee Pipkin 1965 is a pleasant White (non-) female.       History of Present Illness  The patient presents for evaluation of hypertriglyceridemia, vitamin D deficiency, smoking, and medication management.    A slight weight gain from 158 to 161 pounds is reported, which may be contributing to elevated cholesterol levels. No cholesterol-lowering medications are currently being taken.    Smoking continues, although at a reduced rate compared to the previous habit of a pack a day. There was a brief cessation period lasting approximately a year.    No recent use of Valtrex is reported.     Appointments have been rescheduled twice due to personal commitments.     APap smear has not been scheduled, but a mammogram is arranged for next month.     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     Duloxetine is taken daily for cervical neck pain.    SOCIAL HISTORY  The patient admits to smoking since high school and currently smokes about half a pack a day, previous 1 pack/day.    FAMILY HISTORY  Her mother has lung cancer diagnosed at age 79 and is undergoing a biopsy to determine if it has metastasized from breast cancer. Her father passed away from acute myeloid leukemia at age 81.      Physical Exam      Physical Exam  Constitutional:       Appearance: Normal appearance.   HENT:      Head: Normocephalic and atraumatic.      Right Ear: Tympanic membrane, ear canal and external ear normal.      Left Ear: Tympanic membrane, ear canal and external ear normal.      Nose: Nose normal.      Mouth/Throat:      Mouth: Mucous membranes are moist.   Eyes:      Extraocular Movements: Extraocular movements intact.      Conjunctiva/sclera: Conjunctivae normal.   Neck:      Vascular: No carotid bruit.

## 2025-04-30 NOTE — ASSESSMENT & PLAN NOTE
Continue meloxicam 15 mg daily as needed  Continue omeprazole 20 mg daily as needed when taking NSAID for blood protection  CMP WNL    1/29/2024:  Had a fall from a ladder back in Aug 2023 (distance of fall unknown)  Was prescribed Mobeun in Sept by Dr Munoz  This therapy effective  Refilled med  Pt seeking xray

## 2025-04-30 NOTE — ASSESSMENT & PLAN NOTE
Continue meloxicam 15 mg daily as needed  Continue omeprazole 20 mg daily as needed when taking NSAID for blood protection  Continue Cymbalta 30 mg daily  CMP WNL

## 2025-04-30 NOTE — ASSESSMENT & PLAN NOTE
Level 31.7  DC over-the-counter vitamin D3 2000 IU  Initiate vitamin D2 50,000 weekly  Recheck level 6 months- Vitamin D levels have improved, increasing from 31 to 46, which is above the 30 audi.  - Refilled vitamin D 50,000 weekly x 12 months   - Recheck vitamin D level 12 months  - Prescription for vitamin D will be sent to [facility].    9/12/2024:Level 31.7  DC over-the-counter vitamin D3 2000 IU  Initiate vitamin D2 50,000 weekly  Recheck level 6 months

## 2025-04-30 NOTE — ASSESSMENT & PLAN NOTE
- Triglyceride levels have escalated from 153 to 179, while LDL cholesterol has increased by 20 points, rising from 85 to 106.  - Minor weight gain noted, with weight fluctuating between 158 and 161.  - Dietary habits, particularly the consumption of fried or oily foods such as avocados, peanuts, salad dressing, and peanut butter, may be contributing to this condition.  - Advised to modify diet to reduce intake of these foods; if triglyceride levels continue to rise, treatment options will be discussed.  - Strongly encourage patient to reduce cholesterol levels.  Discussed need for statin if levels continue to rise  - Patient not wanting to start statin therapy   Recheck levels 12 months    1/29/2024:  -153; LDL 85  Bernard level 12m    Reduce fried fatty or oily foods in diet  Limit red meats, processed foods, and alcohol.    Limit fast food  Increase physical activity to 60 minutes of moderate intensity aerobic exercise per week.  Increase water intake  Reduce alcohol intake    How to raise HDL if low:  Consume oats, beans, legumes, olive oil, whole grains, nuts, seeds, fatty fish, and berries.  Lose weight/fat  Reduce alcohol consumption  Smoking cessation

## 2025-05-06 NOTE — TELEPHONE ENCOUNTER
Last OV: 04/30/25  Next OV: 10/21/25  Last RX: 06/14/23      Per pt msg, pt believes she is getting ready to have an episode. Please advise.      JELLY Braun LPN

## 2025-05-07 RX ORDER — VALACYCLOVIR HYDROCHLORIDE 1 G/1
1000 TABLET, FILM COATED ORAL 2 TIMES DAILY
Qty: 15 TABLET | Refills: 0 | Status: SHIPPED | OUTPATIENT
Start: 2025-05-07

## 2025-05-27 ENCOUNTER — HOSPITAL ENCOUNTER (OUTPATIENT)
Facility: HOSPITAL | Age: 60
Discharge: HOME | End: 2025-05-30
Payer: COMMERCIAL

## 2025-05-27 VITALS — WEIGHT: 155 LBS | HEIGHT: 64 IN | BODY MASS INDEX: 26.46 KG/M2

## 2025-05-27 DIAGNOSIS — Z12.31 ENCOUNTER FOR SCREENING MAMMOGRAM FOR MALIGNANT NEOPLASM OF BREAST: ICD-10-CM

## 2025-05-27 PROCEDURE — 77063 BREAST TOMOSYNTHESIS BI: CPT
